# Patient Record
Sex: FEMALE | Race: WHITE | NOT HISPANIC OR LATINO | Employment: FULL TIME | URBAN - NONMETROPOLITAN AREA
[De-identification: names, ages, dates, MRNs, and addresses within clinical notes are randomized per-mention and may not be internally consistent; named-entity substitution may affect disease eponyms.]

---

## 2017-05-15 RX ORDER — NORETHINDRONE ACETATE AND ETHINYL ESTRADIOL 1MG-20(21)
KIT ORAL
Qty: 28 TABLET | Refills: 11 | OUTPATIENT
Start: 2017-05-15

## 2017-05-19 RX ORDER — NORETHINDRONE ACETATE AND ETHINYL ESTRADIOL 1MG-20(21)
KIT ORAL
Qty: 28 TABLET | Refills: 11 | OUTPATIENT
Start: 2017-05-19

## 2017-06-14 RX ORDER — NORETHINDRONE ACETATE AND ETHINYL ESTRADIOL 1MG-20(21)
KIT ORAL
Qty: 28 TABLET | Refills: 0 | OUTPATIENT
Start: 2017-06-14

## 2017-06-16 RX ORDER — NORETHINDRONE ACETATE AND ETHINYL ESTRADIOL 1MG-20(21)
KIT ORAL
Qty: 28 TABLET | Refills: 0 | OUTPATIENT
Start: 2017-06-16

## 2017-07-18 RX ORDER — NORETHINDRONE ACETATE AND ETHINYL ESTRADIOL 1MG-20(21)
KIT ORAL
Qty: 28 TABLET | Refills: 0 | OUTPATIENT
Start: 2017-07-18

## 2018-10-26 ENCOUNTER — OFFICE VISIT (OUTPATIENT)
Dept: INTERNAL MEDICINE | Facility: CLINIC | Age: 28
End: 2018-10-26

## 2018-10-26 VITALS
SYSTOLIC BLOOD PRESSURE: 102 MMHG | OXYGEN SATURATION: 98 % | HEART RATE: 97 BPM | WEIGHT: 191 LBS | RESPIRATION RATE: 16 BRPM | BODY MASS INDEX: 29.98 KG/M2 | DIASTOLIC BLOOD PRESSURE: 84 MMHG | HEIGHT: 67 IN | TEMPERATURE: 97.9 F

## 2018-10-26 DIAGNOSIS — E10.65 TYPE 1 DIABETES MELLITUS WITH HYPERGLYCEMIA (HCC): ICD-10-CM

## 2018-10-26 DIAGNOSIS — N39.0 URINARY TRACT INFECTION WITHOUT HEMATURIA, SITE UNSPECIFIED: Primary | ICD-10-CM

## 2018-10-26 DIAGNOSIS — M54.5 LOW BACK PAIN, UNSPECIFIED BACK PAIN LATERALITY, UNSPECIFIED CHRONICITY, WITH SCIATICA PRESENCE UNSPECIFIED: ICD-10-CM

## 2018-10-26 LAB
BILIRUB BLD-MCNC: NEGATIVE MG/DL
CLARITY, POC: ABNORMAL
COLOR UR: YELLOW
GLUCOSE UR STRIP-MCNC: ABNORMAL MG/DL
KETONES UR QL: NEGATIVE
LEUKOCYTE EST, POC: ABNORMAL
NITRITE UR-MCNC: NEGATIVE MG/ML
PH UR: 7 [PH] (ref 5–8)
PROT UR STRIP-MCNC: ABNORMAL MG/DL
RBC # UR STRIP: NEGATIVE /UL
SP GR UR: 1.01 (ref 1–1.03)
UROBILINOGEN UR QL: NORMAL

## 2018-10-26 PROCEDURE — 81003 URINALYSIS AUTO W/O SCOPE: CPT | Performed by: NURSE PRACTITIONER

## 2018-10-26 PROCEDURE — 99214 OFFICE O/P EST MOD 30 MIN: CPT | Performed by: NURSE PRACTITIONER

## 2018-10-26 RX ORDER — SULFAMETHOXAZOLE AND TRIMETHOPRIM 800; 160 MG/1; MG/1
1 TABLET ORAL 2 TIMES DAILY
Qty: 14 TABLET | Refills: 0 | Status: SHIPPED | OUTPATIENT
Start: 2018-10-26 | End: 2018-11-02

## 2018-10-26 RX ORDER — RANITIDINE 150 MG/1
150 TABLET ORAL DAILY
COMMUNITY
End: 2019-02-26 | Stop reason: SDUPTHER

## 2018-10-26 RX ORDER — FLUCONAZOLE 150 MG/1
150 TABLET ORAL ONCE
Qty: 1 TABLET | Refills: 1 | Status: SHIPPED | OUTPATIENT
Start: 2018-10-26 | End: 2018-10-26

## 2018-10-26 NOTE — PROGRESS NOTES
Chief Complaint / Reason:      Chief Complaint   Patient presents with   • Urinary Tract Infection   • Back Pain       Subjective     HPI  Patient presents today to establish care and with complaints of left flank pain and possible kidney infection.  She denies fever or chills but states that she has been having a lot of burning and frequent urination.  Past medical history includes acid reflux depression diabetes.  Patient does see endocrinology for her diabetes and she reported her last hemoglobin A1c is 9.3.  Patient is up-to-date on vision and dental.  Patient denies any history of STDs besides HSV.  Age of menarche was 13 and she became sexually active when she was 18.  She denies any chance of pregnancy.  Surgical history includes wisdom teeth extraction.  She denies any substance abuse with exception of 3 glasses of alcohol per week.  History taken from: patient    PMH/FH/Social History were reviewed and updated appropriately in the electronic medical record.     Review of Systems:   Review of Systems   Constitutional: Positive for fatigue. Negative for appetite change, chills and fever.   HENT: Positive for ear pain. Negative for nosebleeds, sneezing and trouble swallowing.    Eyes: Negative.    Respiratory: Negative.  Negative for choking, chest tightness, shortness of breath and wheezing.    Cardiovascular: Negative.  Negative for palpitations and leg swelling.   Gastrointestinal: Negative.  Negative for abdominal pain, blood in stool, constipation, diarrhea, nausea and vomiting.   Endocrine: Positive for heat intolerance.   Genitourinary: Positive for difficulty urinating, dysuria and pelvic pain. Negative for frequency.   Musculoskeletal: Negative.  Negative for gait problem, neck pain and neck stiffness.   Skin: Negative.    Allergic/Immunologic: Negative.    Neurological: Positive for headaches. Negative for weakness, light-headedness and numbness.   Hematological: Negative.     Psychiatric/Behavioral: Positive for sleep disturbance. Negative for dysphoric mood.        Emotional problems     All other systems were reviewed and are negative.  Exceptions are noted in the subjective or above.      Objective     Vital Signs  Vitals:    10/26/18 1447   BP: 102/84   Pulse: 97   Resp: 16   Temp: 97.9 °F (36.6 °C)   SpO2: 98%       Body mass index is 29.91 kg/m².    Physical Exam   Constitutional: She is oriented to person, place, and time. She appears well-developed and well-nourished. No distress.   HENT:   Head: Normocephalic.   Right Ear: External ear normal.   Left Ear: External ear normal.   Neck: No thyromegaly present.   Cardiovascular: Normal rate, regular rhythm, normal heart sounds and intact distal pulses.    Pulmonary/Chest: Effort normal and breath sounds normal. She has no wheezes. She exhibits no tenderness.   Abdominal: Soft. Bowel sounds are normal. There is tenderness in the right lower quadrant, suprapubic area and left lower quadrant. There is CVA tenderness.   Lymphadenopathy:     She has no cervical adenopathy.   Neurological: She is alert and oriented to person, place, and time.   Skin: Skin is warm and dry. No rash noted. No erythema. No pallor.   Psychiatric: She has a normal mood and affect. Her behavior is normal. Judgment and thought content normal.   Nursing note and vitals reviewed.       Results Review:    I reviewed the patient's new clinical results.   Office Visit on 10/26/2018   Component Date Value Ref Range Status   • Color 10/26/2018 Yellow  Yellow, Straw, Dark Yellow, Ashley Final   • Clarity, UA 10/26/2018 Cloudy* Clear Final   • Specific Gravity  10/26/2018 1.010  1.005 - 1.030 Final   • pH, Urine 10/26/2018 7.0  5.0 - 8.0 Final   • Leukocytes 10/26/2018 500 Chase/ul* Negative Final   • Nitrite, UA 10/26/2018 Negative  Negative Final   • Protein, POC 10/26/2018 Trace* Negative mg/dL Final   • Glucose, UA 10/26/2018 250 mg/dL* Negative, 1000 mg/dL (3+) mg/dL  Final   • Ketones, UA 10/26/2018 Negative  Negative Final   • Urobilinogen, UA 10/26/2018 Normal  Normal Final   • Bilirubin 10/26/2018 Negative  Negative Final   • Blood, UA 10/26/2018 Negative  Negative Final       Medication Review:     Current Outpatient Prescriptions:   •  acyclovir (ZOVIRAX) 400 MG tablet, Take 1 tablet by mouth 5 (five) times per day, Disp: 35 tablet, Rfl: 0  •  glucose blood test strip, Use to test blood glucose 5 times a day, Disp: 450 each, Rfl: 3  •  insulin lispro (humaLOG) 100 UNIT/ML injection, Use in insulin pump. MAX 90 units per day, Disp: 80 mL, Rfl: 1  •  raNITIdine (ZANTAC) 150 MG tablet, Take 150 mg by mouth Daily., Disp: , Rfl:   •  sertraline (ZOLOFT) 25 MG tablet, Take 1 tablet by mouth Daily. (Patient taking differently: Take 50 mg by mouth Daily.), Disp: 30 tablet, Rfl: 5  •  sulfamethoxazole-trimethoprim (BACTRIM DS) 800-160 MG per tablet, Take 1 tablet by mouth 2 (Two) Times a Day for 7 days., Disp: 14 tablet, Rfl: 0    Assessment/Plan   Fariba was seen today for urinary tract infection and back pain.    Diagnoses and all orders for this visit:    Urinary tract infection without hematuria, site unspecified  -     POCT urinalysis dipstick, automated  -     Urine Culture - Urine, Urine, Clean Catch  -     sulfamethoxazole-trimethoprim (BACTRIM DS) 800-160 MG per tablet; Take 1 tablet by mouth 2 (Two) Times a Day for 7 days.  Increase fluid intake and rest.  Void often to empty bladder.  Wipe from front to back.  Empty your bladder after intercourse.   Avoid potentially irritating feminine products.      Low back pain, unspecified back pain laterality, unspecified chronicity, with sciatica presence unspecified  -     POCT urinalysis dipstick, automated    Type 1 diabetes mellitus with hyperglycemia (CMS/[facility])  Recommend patient maintain glycemic control to prevent UTIs and worsening conditions  Other orders  -     fluconazole (DIFLUCAN) 150 MG tablet; Take 1 tablet by mouth 1  (One) Time for 1 dose.      Return if symptoms worsen or fail to improve.    Marilin Thakur, APRN  10/26/2018

## 2018-10-29 LAB
BACTERIA UR CULT: ABNORMAL
BACTERIA UR CULT: ABNORMAL

## 2018-10-30 PROBLEM — E11.9 DIABETES MELLITUS (HCC): Status: ACTIVE | Noted: 2018-10-30

## 2018-10-30 NOTE — PROGRESS NOTES
Please contact patient and let her know results and see how she is doing.  If she is still having a lot of symptoms then we can give her a shot of penicillin as it was beta hemolytic strep group B.

## 2019-01-10 ENCOUNTER — OFFICE VISIT (OUTPATIENT)
Dept: INTERNAL MEDICINE | Facility: CLINIC | Age: 29
End: 2019-01-10

## 2019-01-10 VITALS
OXYGEN SATURATION: 98 % | HEART RATE: 74 BPM | TEMPERATURE: 97.6 F | WEIGHT: 191 LBS | DIASTOLIC BLOOD PRESSURE: 76 MMHG | SYSTOLIC BLOOD PRESSURE: 119 MMHG | HEIGHT: 67 IN | RESPIRATION RATE: 15 BRPM | BODY MASS INDEX: 29.98 KG/M2

## 2019-01-10 DIAGNOSIS — F33.0 MILD EPISODE OF RECURRENT MAJOR DEPRESSIVE DISORDER (HCC): ICD-10-CM

## 2019-01-10 DIAGNOSIS — R30.0 DYSURIA: Primary | ICD-10-CM

## 2019-01-10 LAB
BILIRUB BLD-MCNC: ABNORMAL MG/DL
CLARITY, POC: ABNORMAL
COLOR UR: YELLOW
GLUCOSE UR STRIP-MCNC: NEGATIVE MG/DL
KETONES UR QL: NEGATIVE
LEUKOCYTE EST, POC: ABNORMAL
NITRITE UR-MCNC: NEGATIVE MG/ML
PH UR: 5 [PH] (ref 5–8)
PROT UR STRIP-MCNC: ABNORMAL MG/DL
RBC # UR STRIP: NEGATIVE /UL
SP GR UR: 1.02 (ref 1–1.03)
UROBILINOGEN UR QL: ABNORMAL

## 2019-01-10 PROCEDURE — 81003 URINALYSIS AUTO W/O SCOPE: CPT | Performed by: NURSE PRACTITIONER

## 2019-01-10 PROCEDURE — 99213 OFFICE O/P EST LOW 20 MIN: CPT | Performed by: NURSE PRACTITIONER

## 2019-01-10 NOTE — PROGRESS NOTES
Chief Complaint / Reason:      Chief Complaint   Patient presents with   • Urinary Tract Infection       Subjective     HPI  Patient presents today for follow-up regarding UTI and states that she has had foul odor to urine but denies any burning or frequency fever or chills.  She does state that it has a foul odor in the mornings.  Her last culture showed group B strep.  Patient states that she has been seeing endocrinology and her last hemoglobin A1c is around 9.  In regards to patient's depression she states she is doing well and taking 50 mg Zoloft daily.  She denies SI or HI.  History taken from: patient    PMH/FH/Social History were reviewed and updated appropriately in the electronic medical record.     Review of Systems:   Review of Systems   Constitutional: Negative.    Respiratory: Negative.    Cardiovascular: Negative.    Gastrointestinal: Negative.    Genitourinary:        Foul odor to urine   Neurological: Negative.      All other systems were reviewed and are negative.  Exceptions are noted in the subjective or above.      Objective     Vital Signs  Vitals:    01/10/19 0937   BP: 119/76   Pulse: 74   Resp: 15   Temp: 97.6 °F (36.4 °C)   SpO2: 98%       Body mass index is 29.91 kg/m².    Physical Exam   Constitutional: She is oriented to person, place, and time. She appears well-developed and well-nourished.   HENT:   Head: Normocephalic.   Right Ear: External ear normal.   Left Ear: External ear normal.   Cardiovascular: Normal rate, regular rhythm and normal heart sounds.   Pulmonary/Chest: Effort normal and breath sounds normal.   Abdominal: Soft. Bowel sounds are normal. There is no CVA tenderness.   Neurological: She is alert and oriented to person, place, and time.   Skin: Skin is warm and dry.   Nursing note and vitals reviewed.       Results Review:    I reviewed the patient's new clinical results.   Office Visit on 01/10/2019   Component Date Value Ref Range Status   • Color 01/10/2019 Yellow   Yellow, Straw, Dark Yellow, Ashley Final   • Clarity, UA 01/10/2019 Cloudy* Clear Final   • Specific Gravity  01/10/2019 1.020  1.005 - 1.030 Final   • pH, Urine 01/10/2019 5.0  5.0 - 8.0 Final   • Leukocytes 01/10/2019 75 Chase/ul* Negative Final   • Nitrite, UA 01/10/2019 Negative  Negative Final   • Protein, POC 01/10/2019 Trace* Negative mg/dL Final   • Glucose, UA 01/10/2019 Negative  Negative, 1000 mg/dL (3+) mg/dL Final   • Ketones, UA 01/10/2019 Negative  Negative Final   • Urobilinogen, UA 01/10/2019 1 E.U./dL * Normal Final   • Bilirubin 01/10/2019 1 mg/dL* Negative Final   • Blood, UA 01/10/2019 Negative  Negative Final         Medication Review:     Current Outpatient Medications:   •  acyclovir (ZOVIRAX) 400 MG tablet, Take 1 tablet by mouth 5 (five) times per day, Disp: 35 tablet, Rfl: 0  •  glucose blood test strip, Use to test blood glucose 5 times a day, Disp: 450 each, Rfl: 3  •  insulin lispro (humaLOG) 100 UNIT/ML injection, Use in insulin pump. MAX 90 units per day, Disp: 80 mL, Rfl: 1  •  raNITIdine (ZANTAC) 150 MG tablet, Take 150 mg by mouth Daily., Disp: , Rfl:   •  sertraline (ZOLOFT) 25 MG tablet, Take 1 tablet by mouth Daily. (Patient taking differently: Take 50 mg by mouth Daily.), Disp: 30 tablet, Rfl: 5    Assessment/Plan   Fariba was seen today for urinary tract infection.    Diagnoses and all orders for this visit:    Dysuria  -     POCT urinalysis dipstick, automated  -     Urine Culture - Urine, Urine, Clean Catch  recommend patient increase water intake and maintain glycemic control  Mild episode of recurrent major depressive disorder (CMS/HCC)  stable on Zoloft      Return if symptoms worsen or fail to improve.    Marilin Thakur, APRN  01/10/2019

## 2019-01-12 LAB
BACTERIA UR CULT: NORMAL
BACTERIA UR CULT: NORMAL

## 2019-01-13 NOTE — PROGRESS NOTES
Please contact  patient and let her know results did not show bacteria but did show mixed urogenital anusha.

## 2019-02-26 RX ORDER — RANITIDINE 150 MG/1
150 TABLET ORAL DAILY
Qty: 90 TABLET | Refills: 3 | Status: SHIPPED | OUTPATIENT
Start: 2019-02-26 | End: 2019-03-05 | Stop reason: SDUPTHER

## 2019-02-26 NOTE — TELEPHONE ENCOUNTER
Patient left a voice mail requesting a 90 supply sent to [facility].    Patient informed medications sent

## 2019-03-05 RX ORDER — RANITIDINE 150 MG/1
150 TABLET ORAL DAILY
Qty: 90 TABLET | Refills: 3 | Status: SHIPPED | OUTPATIENT
Start: 2019-03-05 | End: 2020-05-08 | Stop reason: SDUPTHER

## 2020-01-14 NOTE — TELEPHONE ENCOUNTER
PT CALLED REQUESTING ACYCLOVIR 400 MG SENT TO [facility] IN [place] CONFIRMED

## 2020-01-15 RX ORDER — ACYCLOVIR 400 MG/1
TABLET ORAL
Qty: 35 TABLET | Refills: 0 | Status: SHIPPED | OUTPATIENT
Start: 2020-01-15 | End: 2020-08-14

## 2020-05-07 NOTE — PROGRESS NOTES
You have chosen to receive care through a telehealth visit.  Do you consent to use a video/audio connection for your medical care today? Yes  No covid symptoms  Involved parties: Hodan Garcia CMA, MELVIN Aquino and patient.  Patient presents for a virtual visit. This visit was scheduled as a video visit to comply with patient safety concerns in accordance with CDC recommendations.    Chief Complaint / Reason:      Chief Complaint   Patient presents with   • Depression     refill on zoloft.        Subjective     HPI  Patient requests refill on Zoloft and states overall she is doing good denies SI or HI.  She states she has been working from home and is doing well.  Patient also requests refill on ranitidine and she and I discussed medication options available for change.  Patient is living in [place] and is not up-to-date on Pap smear.  She states that she did have labs done through endocrinology but labs are not available for review at this time.  History taken from: patient    PMH/FH/Social History were reviewed and updated appropriately in the electronic medical record.   Past Medical History:   Diagnosis Date   • Diabetes mellitus (CMS/[facility])    • GERD (gastroesophageal reflux disease)    • HSV (herpes simplex virus) anogenital infection      Past Surgical History:   Procedure Laterality Date   • WISDOM TOOTH EXTRACTION       Social History     Socioeconomic History   • Marital status: Single     Spouse name: Not on file   • Number of children: Not on file   • Years of education: Not on file   • Highest education level: Not on file     No family history on file.    Review of Systems:   Review of Systems   Constitutional: Negative.    Respiratory: Negative.    Cardiovascular: Negative.    Neurological: Negative.    Psychiatric/Behavioral: Negative.          All other systems were reviewed and are negative.  Exceptions are noted in the subjective or above.      Objective     Vital Signs  There were no vitals  filed for this visit.    There is no height or weight on file to calculate BMI.    Physical Exam   Constitutional: She is oriented to person, place, and time. She appears well-developed and well-nourished. No distress.   Physical examination directed per patient.   Pulmonary/Chest: Effort normal. No respiratory distress. She exhibits no tenderness.   Musculoskeletal: Normal range of motion.   Neurological: She is alert and oriented to person, place, and time. Coordination normal.   Skin: Skin is warm and dry. Capillary refill takes less than 2 seconds.   Psychiatric: She has a normal mood and affect. Her behavior is normal. Judgment and thought content normal.   Nursing note reviewed.           Results Review:    I reviewed the patient's previous clinical results.       Medication Review:     Current Outpatient Medications:   •  acyclovir (ZOVIRAX) 400 MG tablet, Take 1 tablet by mouth 5 (five) times per day, Disp: 35 tablet, Rfl: 0  •  glucose blood test strip, Use to test blood glucose 5 times a day, Disp: 450 each, Rfl: 3  •  insulin lispro (humaLOG) 100 UNIT/ML injection, Use in insulin pump. MAX 90 units per day, Disp: 80 mL, Rfl: 1  •  sertraline (ZOLOFT) 50 MG tablet, Take 1 tablet by mouth Daily., Disp: 90 tablet, Rfl: 3  •  Accu-Chek Softclix Lancets lancets, Accu-Chek Softclix Lancets, Disp: , Rfl:   •  famotidine (PEPCID) 10 MG tablet, Take 2 tablets by mouth At Night As Needed for Indigestion or Heartburn., Disp: 90 tablet, Rfl: 1  •  glucose blood test strip, Accu-Chek Guide Glucose Meter, Disp: , Rfl:   •  NOVOLOG 100 UNIT/ML injection, INJECT 120 UNITS DAILY IN PUMP, Disp: , Rfl:   •  promethazine (PHENERGAN) 25 MG tablet, promethazine 25 mg tablet, Disp: , Rfl:     Assessment/Plan   Fariba was seen today for depression.    Diagnoses and all orders for this visit:    Mild episode of recurrent major depressive disorder (CMS/HCC)  -     sertraline (ZOLOFT) 50 MG tablet; Take 1 tablet by mouth  Daily.  Stable on current medication regimen  Pap smear for cervical cancer screening  -     Ambulatory Referral to Gynecology    Gastroesophageal reflux disease, esophagitis presence not specified  -     famotidine (PEPCID) 10 MG tablet; Take 2 tablets by mouth At Night As Needed for Indigestion or Heartburn.  Discontinued Zantac    Need to obtain labs from endocrinology  Return if symptoms worsen or fail to improve, for Annual.    Marilin Thakur, APRN  05/08/2020

## 2020-05-08 ENCOUNTER — TELEMEDICINE (OUTPATIENT)
Dept: INTERNAL MEDICINE | Facility: CLINIC | Age: 30
End: 2020-05-08

## 2020-05-08 DIAGNOSIS — F33.0 MILD EPISODE OF RECURRENT MAJOR DEPRESSIVE DISORDER (HCC): Primary | ICD-10-CM

## 2020-05-08 DIAGNOSIS — K21.9 GASTROESOPHAGEAL REFLUX DISEASE, ESOPHAGITIS PRESENCE NOT SPECIFIED: ICD-10-CM

## 2020-05-08 DIAGNOSIS — Z12.4 PAP SMEAR FOR CERVICAL CANCER SCREENING: ICD-10-CM

## 2020-05-08 PROCEDURE — 99213 OFFICE O/P EST LOW 20 MIN: CPT | Performed by: NURSE PRACTITIONER

## 2020-05-08 RX ORDER — PANTOPRAZOLE SODIUM 20 MG/1
TABLET, DELAYED RELEASE ORAL
COMMUNITY
End: 2020-05-08

## 2020-05-08 RX ORDER — PROMETHAZINE HYDROCHLORIDE 25 MG/1
TABLET ORAL
COMMUNITY

## 2020-05-08 RX ORDER — LANCETS
EACH MISCELLANEOUS
COMMUNITY

## 2020-05-08 RX ORDER — FAMOTIDINE 10 MG
20 TABLET ORAL NIGHTLY PRN
Qty: 90 TABLET | Refills: 1 | Status: SHIPPED | OUTPATIENT
Start: 2020-05-08

## 2020-05-29 ENCOUNTER — RESULTS ENCOUNTER (OUTPATIENT)
Dept: INTERNAL MEDICINE | Facility: CLINIC | Age: 30
End: 2020-05-29

## 2020-05-29 DIAGNOSIS — R11.14 BILIOUS VOMITING WITH NAUSEA: ICD-10-CM

## 2020-05-29 DIAGNOSIS — E10.65 TYPE 1 DIABETES MELLITUS WITH HYPERGLYCEMIA (HCC): ICD-10-CM

## 2020-05-29 DIAGNOSIS — E10.65 TYPE 1 DIABETES MELLITUS WITH HYPERGLYCEMIA (HCC): Primary | ICD-10-CM

## 2020-08-14 RX ORDER — ACYCLOVIR 400 MG/1
TABLET ORAL
Qty: 35 TABLET | Refills: 0 | Status: SHIPPED | OUTPATIENT
Start: 2020-08-14 | End: 2021-05-07

## 2020-12-21 ENCOUNTER — OFFICE VISIT (OUTPATIENT)
Dept: ENDOCRINOLOGY | Facility: CLINIC | Age: 30
End: 2020-12-21

## 2020-12-21 VITALS
BODY MASS INDEX: 30.31 KG/M2 | RESPIRATION RATE: 16 BRPM | HEART RATE: 92 BPM | OXYGEN SATURATION: 100 % | WEIGHT: 200 LBS | DIASTOLIC BLOOD PRESSURE: 74 MMHG | HEIGHT: 68 IN | TEMPERATURE: 97.1 F | SYSTOLIC BLOOD PRESSURE: 128 MMHG

## 2020-12-21 DIAGNOSIS — E10.65 TYPE 1 DIABETES MELLITUS WITH HYPERGLYCEMIA (HCC): Primary | ICD-10-CM

## 2020-12-21 LAB
EXPIRATION DATE: NORMAL
HBA1C MFR BLD: 9.5 %
Lab: NORMAL

## 2020-12-21 PROCEDURE — 83036 HEMOGLOBIN GLYCOSYLATED A1C: CPT | Performed by: PHYSICIAN ASSISTANT

## 2020-12-21 PROCEDURE — 99213 OFFICE O/P EST LOW 20 MIN: CPT | Performed by: PHYSICIAN ASSISTANT

## 2020-12-21 RX ORDER — PROCHLORPERAZINE 25 MG/1
1 SUPPOSITORY RECTAL SEE ADMIN INSTRUCTIONS
Qty: 1 DEVICE | Refills: 0 | Status: SHIPPED | OUTPATIENT
Start: 2020-12-21

## 2020-12-21 RX ORDER — BLOOD SUGAR DIAGNOSTIC
STRIP MISCELLANEOUS
Qty: 500 EACH | Refills: 3 | Status: SHIPPED | OUTPATIENT
Start: 2020-12-21 | End: 2022-02-11 | Stop reason: SDUPTHER

## 2020-12-21 RX ORDER — PROCHLORPERAZINE 25 MG/1
SUPPOSITORY RECTAL
Qty: 9 EACH | Refills: 3 | Status: SHIPPED | OUTPATIENT
Start: 2020-12-21

## 2020-12-21 RX ORDER — PROCHLORPERAZINE 25 MG/1
1 SUPPOSITORY RECTAL
Qty: 1 EACH | Refills: 3 | Status: SHIPPED | OUTPATIENT
Start: 2020-12-21

## 2020-12-21 NOTE — PROGRESS NOTES
"     Office Note      Date: 2020  Patient Name: Fariba Bronson  MRN: [id number]  : 1990    Chief Complaint   Patient presents with   • Diabetes       History of Present Illness:   Fariba Bronson is a 30 y.o. female who presents for 1 diabetes.  Patient reports she started working for [employer] and was going there for her diabetes control.  She reports she feels like her blood sugars are out of control and her high most of the time.  She remains on her Omni pod insulin pump with NovoLog insulin.  She is checking her blood sugar 4 times daily and is interested in considering a Dexcom continuous glucose monitor.  She denies any severe or frequent hypoglycemia but does note she feels low when her blood sugars are in the 90s.  She reports she has not been eating like she should has not been as active since she has been working from home during the pandemic.  Reports she goes for her annual eye exams in January.  She has not had her flu vaccine yet this  and prefers not to get it today.  She denies any trouble with her feet.  She reports she had full fasting labs checked in the spring at [employer] we will try to get copies of these results and send them to us.      Subjective     Review of Systems:   Review of Systems   Constitutional: Negative.    Cardiovascular: Negative.    Gastrointestinal: Negative.    Endocrine: Negative.    Neurological: Negative.        The following portions of the patient's history were reviewed and updated as appropriate: allergies, current medications, past family history, past medical history, past social history, past surgical history and problem list.    Objective     Vitals:    20 1538   BP: 128/74   Pulse: 92   Resp: 16   Temp: 97.1 °F (36.2 °C)   TempSrc: Temporal   SpO2: 100%   Weight: 90.7 kg (200 lb)   Height: 172.7 cm (68\")     Body mass index is 30.41 kg/m².    Physical Exam  Vitals signs reviewed.   Constitutional:       General: She is not in acute distress.     " Appearance: Normal appearance.   Cardiovascular:      Pulses:           Dorsalis pedis pulses are 2+ on the right side and 2+ on the left side.        Posterior tibial pulses are 2+ on the right side and 2+ on the left side.   Musculoskeletal:      Right foot: No deformity.      Left foot: No deformity.   Feet:      Right foot:      Protective Sensation: 5 sites tested. 5 sites sensed.      Skin integrity: Dry skin present.      Toenail Condition: Right toenails are normal.      Left foot:      Protective Sensation: 5 sites tested. 5 sites sensed.      Skin integrity: Dry skin present.      Toenail Condition: Left toenails are normal.      Comments: Diabetic Foot Exam Performed and Monofilament Test Performed    Neurological:      Mental Status: She is alert.         HEMOGLOBIN A1C  Lab Results   Component Value Date    HGBA1C 9.5 12/21/2020           Assessment / Plan      Assessment & Plan:  1. Type 1 diabetes mellitus with hyperglycemia (CMS/[facility])  Her A1c is 9.5% today.  This is up as compared to her last A1c in April 2019 when she last saw us and is well above goal.  Reviewed OmniPod download increased her basal rates by 0.1 from 1030 to 2:30 PM and 2:30 PM to 12 AM and changed her carb ratio during the day from 10 to 8 to try to improve hyperglycemia.  Encourage patient to send in blood sugars for review and adjustment as needed.  Sent in prescription for Dexcom supplies to her local pharmacy to see if this would be covered.  If this is not covered advised patient we could send in paperwork through Dexcom.  Blood pressure okay today  Her weight is up 11 pounds since she was seen April 2019.  Courage healthy eating habits and physical activity  Foot exam okay today.  Patient to call as needed  - POC Glycosylated Hemoglobin (Hb A1C)      Return in about 3 months (around 3/21/2021) for Recheck.     Deidre Joshi PA-C  12/21/2020

## 2020-12-30 ENCOUNTER — TELEPHONE (OUTPATIENT)
Dept: ENDOCRINOLOGY | Facility: CLINIC | Age: 30
End: 2020-12-30

## 2020-12-30 NOTE — TELEPHONE ENCOUNTER
Called pt and told we received the PA request and will get it processed. She did not have any clinical questions. She verbalized understanding.

## 2020-12-30 NOTE — TELEPHONE ENCOUNTER
Kasie called wanting to speak to someone about prior authorization for a medication and has some clinical  questions. Please advise

## 2020-12-31 ENCOUNTER — TELEPHONE (OUTPATIENT)
Dept: ENDOCRINOLOGY | Facility: CLINIC | Age: 30
End: 2020-12-31

## 2020-12-31 NOTE — TELEPHONE ENCOUNTER
Approvedon December 30  PA Case: [id number], Status: Approved, Coverage Starts on: 12/30/2020 12:00:00 AM, Coverage Ends on: 12/31/2021 12:00:00 AM. Questions? Contact [phone].  Drug  Dexcom G6  device

## 2021-02-02 ENCOUNTER — TELEPHONE (OUTPATIENT)
Dept: ENDOCRINOLOGY | Facility: CLINIC | Age: 31
End: 2021-02-02

## 2021-02-02 NOTE — TELEPHONE ENCOUNTER
Called and spoke with pt-- she is on her omnipod pump and is not using Basaglar.  She reports her BGs have been elevated between 5-10p-- she will increase her bolus by 2-4 units to see if this helps and explained how to set increased temp basal rate if that does not help.  She will call prn.  RT  
Pt called her blood sugars running in the evening 400 to 500 this happen every month around her period time. Pt want to know if this is a basaglar correct or carb ratio corrections. Pt last seen 12/21/20 pt next appt 03/23//21. Please notify pt  
Spoke to pt.  She is checking 3-4 times per day and its reading 200's - 500's every month around her period.   She wants to know if she should correct basaglar or carb ratio correct?  Please advise  
DISCHARGE

## 2021-03-23 ENCOUNTER — OFFICE VISIT (OUTPATIENT)
Dept: ENDOCRINOLOGY | Facility: CLINIC | Age: 31
End: 2021-03-23

## 2021-03-23 VITALS
TEMPERATURE: 98.2 F | HEIGHT: 67 IN | BODY MASS INDEX: 32.99 KG/M2 | WEIGHT: 210.2 LBS | SYSTOLIC BLOOD PRESSURE: 116 MMHG | DIASTOLIC BLOOD PRESSURE: 78 MMHG

## 2021-03-23 DIAGNOSIS — E10.65 TYPE 1 DIABETES MELLITUS WITH HYPERGLYCEMIA (HCC): Primary | ICD-10-CM

## 2021-03-23 LAB
EXPIRATION DATE: NORMAL
HBA1C MFR BLD: 8.5 %
Lab: NORMAL

## 2021-03-23 PROCEDURE — 99213 OFFICE O/P EST LOW 20 MIN: CPT | Performed by: PHYSICIAN ASSISTANT

## 2021-03-23 PROCEDURE — 83036 HEMOGLOBIN GLYCOSYLATED A1C: CPT | Performed by: PHYSICIAN ASSISTANT

## 2021-03-23 NOTE — PROGRESS NOTES
"     Office Note      Date: 2021  Patient Name: Fariba Bronson  MRN: [id number]  : 1990    Chief Complaint   Patient presents with   • Follow-up   • Diabetes       History of Present Illness:   Fariba Bronson is a 30 y.o. female who presents for type 1 diabetes.  She remains on her Omni pod insulin pump with NovoLog insulin.  She reports she is going to wait on the Dexcom until  plans to meet her deductible adding this device.  She reports it was going to be expensive when she tried to get it earlier but it was approved by insurance.  She denies any trouble with hypoglycemia.  Reports her blood sugars have improved but still continue to stay in the 200s most of the time she checks.  She is not having as many 300 and 400 mg/dL readings.  She reports her periods continue to be a problem she would like to add a second basal rate to help improve this.  She reports she is concerned about her weight she reports she is trying to eat healthier and still gained 10 pounds since her appointment in December.  She reports she is due for eye exam and will get this scheduled.  She does not plan to get the Covid vaccine.  We did her foot exam at her appointment in December.  She had labs late spring we will plan to the these next appointment.      Subjective     Review of Systems:   Review of Systems   Constitutional: Positive for unexpected weight change.   Cardiovascular: Negative.    Gastrointestinal: Negative.    Endocrine: Negative.    Neurological: Negative.        The following portions of the patient's history were reviewed and updated as appropriate: allergies, current medications, past family history, past medical history, past social history, past surgical history and problem list.    Objective     Vitals:    21 1443   BP: 116/78   Temp: 98.2 °F (36.8 °C)   TempSrc: Infrared   Weight: 95.3 kg (210 lb 3.2 oz)   Height: 170.2 cm (67\")     Body mass index is 32.92 kg/m².    Physical " Exam  Vitals reviewed.   Constitutional:       General: She is not in acute distress.     Appearance: Normal appearance.   Neurological:      Mental Status: She is alert.         HEMOGLOBIN A1C  Lab Results   Component Value Date    HGBA1C 8.5 03/23/2021       Assessment / Plan      Assessment & Plan:  1. Type 1 diabetes mellitus with hyperglycemia (CMS/[facility])  A1c has improved but is still above goal at 8.5%.  I reviewed her OmniPod download and we increased her 10:30 AM to 2:30 PM basal rate by 0.1 to try to prevent hyperglycemia.  We also changed her insulin sensitivity factor from 40-30 to help improve hyperglycemia.  We added a second basal rate profile with 0.2 higher basal rates than her regular basal rates to use during her period.  Discussed sending her for nutrition counseling.  She reports she has had this before and does not feel this would help.  We discussed considering adding Symlin her diabetes regimen to help promote weight loss.  We also discussed considering ending her to the medical weight loss clinic through [facility].  She prefers to hold off on these options for now but will let me know.  Encourage patient to call with blood sugars as needed for review and adjustment.  Patient to call as needed  - POC Glycosylated Hemoglobin (Hb A1C)      Return in about 3 months (around 6/23/2021) for Recheck.     Deidre Joshi PA-C  03/23/2021

## 2021-05-07 RX ORDER — VALACYCLOVIR HYDROCHLORIDE 1 G/1
1000 TABLET, FILM COATED ORAL 2 TIMES DAILY
Qty: 10 TABLET | Refills: 1 | Status: SHIPPED | OUTPATIENT
Start: 2021-05-07 | End: 2021-10-17 | Stop reason: SDUPTHER

## 2021-05-10 RX ORDER — VALACYCLOVIR HYDROCHLORIDE 1 G/1
1000 TABLET, FILM COATED ORAL 2 TIMES DAILY
Qty: 10 TABLET | Refills: 1 | Status: CANCELLED | OUTPATIENT
Start: 2021-05-10

## 2021-09-01 ENCOUNTER — OFFICE VISIT (OUTPATIENT)
Dept: ENDOCRINOLOGY | Facility: CLINIC | Age: 31
End: 2021-09-01

## 2021-09-01 VITALS
SYSTOLIC BLOOD PRESSURE: 118 MMHG | HEART RATE: 84 BPM | WEIGHT: 205 LBS | OXYGEN SATURATION: 98 % | HEIGHT: 67 IN | DIASTOLIC BLOOD PRESSURE: 76 MMHG | BODY MASS INDEX: 32.18 KG/M2

## 2021-09-01 DIAGNOSIS — E10.65 TYPE 1 DIABETES MELLITUS WITH HYPERGLYCEMIA (HCC): Primary | ICD-10-CM

## 2021-09-01 LAB
EXPIRATION DATE: ABNORMAL
EXPIRATION DATE: NORMAL
GLUCOSE BLDC GLUCOMTR-MCNC: 164 MG/DL (ref 70–130)
HBA1C MFR BLD: 9.1 %
Lab: ABNORMAL
Lab: NORMAL

## 2021-09-01 PROCEDURE — 99213 OFFICE O/P EST LOW 20 MIN: CPT | Performed by: PHYSICIAN ASSISTANT

## 2021-09-01 PROCEDURE — 83036 HEMOGLOBIN GLYCOSYLATED A1C: CPT | Performed by: PHYSICIAN ASSISTANT

## 2021-09-01 PROCEDURE — 82947 ASSAY GLUCOSE BLOOD QUANT: CPT | Performed by: PHYSICIAN ASSISTANT

## 2021-09-01 RX ORDER — FLUTICASONE PROPIONATE 50 MCG
2 SPRAY, SUSPENSION (ML) NASAL DAILY
Qty: 15.8 ML | Refills: 3 | Status: SHIPPED | OUTPATIENT
Start: 2021-09-01

## 2021-09-01 NOTE — PROGRESS NOTES
"     Office Note      Date: 2021  Patient Name: Fariba Bronson  MRN: [id number]  : 1990    Chief Complaint   Patient presents with   • Diabetes       History of Present Illness:   Fariba Bronson is a 30 y.o. female who presents for follow up for Type 1 diabetes.  She remains on the OmniPod insulin pump with NovoLog insulin.  She reports she was checking her blood sugars pretty regularly but recently has slacked off some.  She reports she has remained on her basal profile to which was the increase basal rate profile we added for when she was on her period.  She reports she has not had any trouble with hypoglycemia on this profile.  She reports she started running recently and is trying to eat smaller portion sizes.  She reports she does bolus regularly when she eats.  She was happy to see she had lost some weight today.  She reports she may be moving to [place] in the near future.  Her boyfriend took a job in [place] has been driving back and forth so they plan to move in the next few months.  She is asking about the Covid vaccine today.  Her job is requiring this.  She denies any trouble with her feet today we did her foot exam at her appointment in December.  We had plan to do labs today but patient is asking if we can hold off for now.      Subjective     Review of Systems:   Review of Systems   Constitutional: Negative.    Cardiovascular: Negative.    Gastrointestinal: Negative.    Endocrine: Negative.    Neurological: Negative.        The following portions of the patient's history were reviewed and updated as appropriate: allergies, current medications, past family history, past medical history, past social history, past surgical history and problem list.    Objective     Vitals:    21 1100   BP: 118/76   BP Location: Right arm   Patient Position: Sitting   Cuff Size: Adult   Pulse: 84   SpO2: 98%   Weight: 93 kg (205 lb)   Height: 170.2 cm (67\")   PainSc: 0-No pain     Body " mass index is 32.11 kg/m².    Physical Exam  Vitals reviewed.   Constitutional:       General: She is not in acute distress.     Appearance: Normal appearance.   Neurological:      Mental Status: She is alert.         HEMOGLOBIN A1C  Lab Results   Component Value Date    HGBA1C 9.1 09/01/2021       GLUCOSE  Glucose   Date Value Ref Range Status   09/01/2021 164 (A) 70 - 130 mg/dL Final           Assessment / Plan      Assessment & Plan:  1. Type 1 diabetes mellitus with hyperglycemia (CMS/[facility])  Her A1c today is higher than it was in March and well above goal at 9.1%.  Her blood glucose this morning was okay at 164 mg/dL.  She reports higher blood sugars in the afternoons and evenings.  I reviewed her OmniPod download today but there is not any blood glucose readings for review.  We increased her basal rate at 2:30 PM by 0.2 to try to prevent hyperglycemia in the afternoons and evenings.  We discussed her blood sugars may improve if she loses weight and continues to run.  She will contact me if hypoglycemia becomes a problem.  I encouraged her to send in blood sugars for review and adjustment as needed.  She does have a prescription for the Dexcom supplies on file if she decides to try this.  Her blood pressure is okay today.  Her weight is down 5 pounds since her appointment in March.  I encouraged continued healthy eating habits and physical activity as tolerated.  I encouraged her to get the Covid vaccine and answered some questions.  She did not want to have labs done today.  We will plan to do these at her follow-up appointment.  Patient to call as needed.  I refilled her insulin today.  - POC Glycosylated Hemoglobin (Hb A1C)  - POC Glucose, Blood      Return in about 3 months (around 12/1/2021) for Recheck 3-4 mos.     This note was dictated using Dragon voice recognition.    Deidre Joshi PA-C  09/01/2021

## 2021-10-17 RX ORDER — VALACYCLOVIR HYDROCHLORIDE 1 G/1
1000 TABLET, FILM COATED ORAL 2 TIMES DAILY
Qty: 10 TABLET | Refills: 1 | Status: SHIPPED | OUTPATIENT
Start: 2021-10-17 | End: 2022-03-28 | Stop reason: SDUPTHER

## 2021-12-28 ENCOUNTER — PATIENT MESSAGE (OUTPATIENT)
Dept: INTERNAL MEDICINE | Facility: CLINIC | Age: 31
End: 2021-12-28

## 2021-12-30 RX ORDER — METRONIDAZOLE 500 MG/1
500 TABLET ORAL 2 TIMES DAILY
Qty: 14 TABLET | Refills: 0 | Status: SHIPPED | OUTPATIENT
Start: 2021-12-30 | End: 2022-01-06

## 2022-02-06 RX ORDER — POLYMYXIN B SULFATE AND TRIMETHOPRIM 1; 10000 MG/ML; [USP'U]/ML
1 SOLUTION OPHTHALMIC
Qty: 10 ML | Refills: 0 | Status: SHIPPED | OUTPATIENT
Start: 2022-02-06 | End: 2022-02-11

## 2022-02-11 RX ORDER — BLOOD SUGAR DIAGNOSTIC
STRIP MISCELLANEOUS
Qty: 500 EACH | Refills: 3 | Status: SHIPPED | OUTPATIENT
Start: 2022-02-11

## 2022-02-11 NOTE — TELEPHONE ENCOUNTER
PT CALLED STATING SHE MOVED TO [place] SHE WORKING WITH HER NEW PRIMARY DOCTOR ON GETTING A REFERRAL TO ENDOCRINOLOGY THERE. SHE NEED A 30 DAY REFILL FOR FREE STYLE [facility], [facility] [address]

## 2022-03-28 RX ORDER — VALACYCLOVIR HYDROCHLORIDE 1 G/1
1000 TABLET, FILM COATED ORAL 2 TIMES DAILY
Qty: 10 TABLET | Refills: 1 | Status: SHIPPED | OUTPATIENT
Start: 2022-03-28

## 2023-10-31 ENCOUNTER — TELEPHONE (OUTPATIENT)
Dept: ENDOCRINOLOGY | Facility: CLINIC | Age: 33
End: 2023-10-31

## 2023-10-31 NOTE — TELEPHONE ENCOUNTER
Caller: Fariba Bronson    Relationship: Self    Best call back number: [phone]    Requested Prescriptions: OMNIPOD 5 - DEXCOM G6 SENSOR - NOVOLOG        Pharmacy where request should be sent:  [facility]/PHARMACY #[id number] [address] [phone] [place] [phone] FX [[id number]]     Last office visit with prescribing clinician: Visit date not found   Last telemedicine visit with prescribing clinician: Visit date not found   Next office visit with prescribing clinician: 2/6/2024     Additional details provided by patient:     Does the patient have less than a 3 day supply:  [x] Yes  [] No    Would you like a call back once the refill request has been completed: [x] Yes [] No    If the office needs to give you a call back, can they leave a voicemail: [x] Yes [] No    Sariah Jeter Rep   10/31/23 14:54 EDT

## 2023-10-31 NOTE — TELEPHONE ENCOUNTER
Spoke with patient.  Has not been seen since 09/2021.  Advised would have to be seen prior to refilling medicines as it has been over 2 years since seen in the office.

## 2023-11-15 ENCOUNTER — OFFICE VISIT (OUTPATIENT)
Dept: INTERNAL MEDICINE | Facility: CLINIC | Age: 33
End: 2023-11-15
Payer: MEDICAID

## 2023-11-15 ENCOUNTER — PRIOR AUTHORIZATION (OUTPATIENT)
Dept: INTERNAL MEDICINE | Facility: CLINIC | Age: 33
End: 2023-11-15

## 2023-11-15 VITALS
HEART RATE: 89 BPM | DIASTOLIC BLOOD PRESSURE: 73 MMHG | WEIGHT: 213 LBS | OXYGEN SATURATION: 97 % | BODY MASS INDEX: 32.28 KG/M2 | SYSTOLIC BLOOD PRESSURE: 120 MMHG | TEMPERATURE: 97.6 F | RESPIRATION RATE: 18 BRPM | HEIGHT: 68 IN

## 2023-11-15 DIAGNOSIS — E10.65 TYPE 1 DIABETES MELLITUS WITH HYPERGLYCEMIA: Primary | ICD-10-CM

## 2023-11-15 DIAGNOSIS — F33.1 MODERATE EPISODE OF RECURRENT MAJOR DEPRESSIVE DISORDER: ICD-10-CM

## 2023-11-15 DIAGNOSIS — F41.9 ANXIETY: ICD-10-CM

## 2023-11-15 PROBLEM — N12 PYELONEPHRITIS: Status: ACTIVE | Noted: 2022-10-09

## 2023-11-15 LAB
EXPIRATION DATE: ABNORMAL
HBA1C MFR BLD: 9.4 % (ref 4.5–5.7)
Lab: ABNORMAL
POC CREATININE URINE: 200
POC MICROALBUMIN URINE: 10

## 2023-11-15 RX ORDER — PROCHLORPERAZINE 25 MG/1
SUPPOSITORY RECTAL
Qty: 9 EACH | Refills: 3 | Status: SHIPPED | OUTPATIENT
Start: 2023-11-15 | End: 2023-11-15 | Stop reason: SDUPTHER

## 2023-11-15 RX ORDER — INSULIN ASPART 100 [IU]/ML
INJECTION, SOLUTION INTRAVENOUS; SUBCUTANEOUS
COMMUNITY
Start: 2023-10-24 | End: 2024-10-24

## 2023-11-15 RX ORDER — INSULIN PMP CART,AUT,G6/7,CNTR
EACH SUBCUTANEOUS
COMMUNITY
Start: 2023-10-18 | End: 2023-11-15 | Stop reason: SDUPTHER

## 2023-11-15 RX ORDER — BLOOD SUGAR DIAGNOSTIC
STRIP MISCELLANEOUS
Qty: 500 EACH | Refills: 3 | Status: SHIPPED | OUTPATIENT
Start: 2023-11-15 | End: 2023-11-16 | Stop reason: SDUPTHER

## 2023-11-15 RX ORDER — PROCHLORPERAZINE 25 MG/1
1 SUPPOSITORY RECTAL SEE ADMIN INSTRUCTIONS
Qty: 1 EACH | Refills: 0 | Status: SHIPPED | OUTPATIENT
Start: 2023-11-15

## 2023-11-15 RX ORDER — INSULIN PMP CART,AUT,G6/7,CNTR
1 EACH SUBCUTANEOUS DAILY
Qty: 5 EACH | Refills: 6 | Status: SHIPPED | OUTPATIENT
Start: 2023-11-15

## 2023-11-15 RX ORDER — GLUCAGON INJECTION, SOLUTION 1 MG/.2ML
0.2 INJECTION, SOLUTION SUBCUTANEOUS ONCE AS NEEDED
Qty: 0.2 ML | Refills: 3 | Status: SHIPPED | OUTPATIENT
Start: 2023-11-15

## 2023-11-15 RX ORDER — ACYCLOVIR 400 MG/1
1 TABLET ORAL
Qty: 9 EACH | Refills: 3 | Status: SHIPPED | OUTPATIENT
Start: 2023-11-15

## 2023-11-15 RX ORDER — GLUCAGON INJECTION, SOLUTION 1 MG/.2ML
0.2 INJECTION, SOLUTION SUBCUTANEOUS
COMMUNITY
Start: 2022-12-22 | End: 2023-11-15 | Stop reason: SDUPTHER

## 2023-11-15 RX ORDER — FAMOTIDINE 10 MG
10 TABLET ORAL DAILY
COMMUNITY
End: 2023-11-15 | Stop reason: SDUPTHER

## 2023-11-15 RX ORDER — ACYCLOVIR 400 MG/1
1 TABLET ORAL
Qty: 1 EACH | Refills: 0 | COMMUNITY
Start: 2023-11-15

## 2023-11-15 RX ORDER — PROCHLORPERAZINE 25 MG/1
1 SUPPOSITORY RECTAL
Qty: 1 EACH | Refills: 3 | Status: SHIPPED | OUTPATIENT
Start: 2023-11-15

## 2023-11-15 NOTE — PROGRESS NOTES
Chief Complaint / Reason:      Chief Complaint   Patient presents with    Med Refill     Re-est care       Subjective     HPI    History taken from: patient    The patient is a 32-year-old female who is here to reestablish care. She needs medication refills and she is type 1 diabetic with hyperglycemia.    The patient inquires about increasing the Zoloft from 25 mg to 75 mg. Her last A1c was approximately 6 percent when she was pregnant. She has an appointment on 02/06/2024. She had a Pap smear at her first appointment.  Her last Pap smear was in 05/2022. She did not have a colposcopy. She believes she gained weight due to being sick. She denies any urinary symptoms.      PMH/FH/Social History were reviewed and updated appropriately in the electronic medical record.   Past Medical History:   Diagnosis Date    Diabetes mellitus     GERD (gastroesophageal reflux disease)     HSV (herpes simplex virus) anogenital infection     Insulin pump in place     Obesity     body mass index 30+-obesity    Type 1 diabetes mellitus, uncontrolled      Past Surgical History:   Procedure Laterality Date    WISDOM TOOTH EXTRACTION       Social History     Socioeconomic History    Marital status: Single   Tobacco Use    Smoking status: Never    Smokeless tobacco: Never   Vaping Use    Vaping Use: Some days    Substances: Nicotine, Flavoring    Devices: Refillable tank   Substance and Sexual Activity    Alcohol use: Defer    Drug use: Defer    Sexual activity: Defer     Family History   Problem Relation Age of Onset    Diabetes Mother     Diabetes Maternal Grandmother     Diabetes Maternal Grandfather     Diabetes Paternal Grandmother        Review of Systems:   Review of Systems      All other systems were reviewed and are negative.  Exceptions are noted in the subjective or above.      Objective     Vital Signs  Vitals:    11/15/23 1525   BP: 120/73   Pulse: 89   Resp: 18   Temp: 97.6 °F (36.4 °C)   SpO2: 97%       Body mass index is  32.39 kg/m².  BMI is >= 30 and <35. (Class 1 Obesity). The following options were offered after discussion;: exercise counseling/recommendations and nutrition counseling/recommendations      Physical Exam  Vitals and nursing note reviewed.   Constitutional:       General: She is not in acute distress.     Appearance: She is well-developed.   Cardiovascular:      Rate and Rhythm: Normal rate and regular rhythm.      Pulses: Normal pulses.      Heart sounds: Normal heart sounds.   Pulmonary:      Effort: Pulmonary effort is normal.      Breath sounds: Normal breath sounds. No wheezing.   Chest:      Chest wall: No tenderness.   Skin:     General: Skin is warm and dry.      Capillary Refill: Capillary refill takes less than 2 seconds.      Coloration: Skin is not pale.      Findings: No erythema or rash.   Neurological:      Mental Status: She is alert and oriented to person, place, and time.   Psychiatric:         Behavior: Behavior normal.         Thought Content: Thought content normal.         Judgment: Judgment normal.              Results Review:    I reviewed the patient's new clinical results.   Office Visit on 11/15/2023   Component Date Value Ref Range Status    Hemoglobin A1C 11/15/2023 9.4 (A)  4.5 - 5.7 % Final    Lot Number 11/15/2023 [id number]   Final    Expiration Date 11/15/2023 7/2/25   Final    Microalbumin, Urine 11/15/2023 10   Final    Creatinine, Urine 11/15/2023 200   Final         Medication Review:     Current Outpatient Medications:     Accu-Chek Softclix Lancets lancets, Accu-Chek Softclix Lancets, Disp: , Rfl:     Continuous Blood Gluc  (Dexcom G6 ) device, Use 1 each See Admin Instructions. Use as directed, Disp: 1 each, Rfl: 0    Continuous Blood Gluc Transmit (Dexcom G6 Transmitter) misc, Use 1 each Every 3 (Three) Months., Disp: 1 each, Rfl: 3    fluticasone (Flonase) 50 MCG/ACT nasal spray, 2 sprays into the nostril(s) as directed by provider Daily., Disp: 15.8 mL,  Rfl: 3    Glucagon (Gvoke HypoPen 2-Pack) 1 MG/0.2ML solution auto-injector, Inject 0.2 mL under the skin into the appropriate area as directed 1 (One) Time As Needed (hypoglycemia) for up to 1 dose., Disp: 0.2 mL, Rfl: 3    Insulin Disposable Pump (Omnipod 5 G6 Pod, Gen 5,) misc, Inject 1 Device under the skin into the appropriate area as directed Daily., Disp: 5 each, Rfl: 6    NovoLOG 100 UNIT/ML injection, INJECT UP  UNITS DAILY VIA INSULIN PUMP., Disp: , Rfl:     sertraline (ZOLOFT) 50 MG tablet, Take 1 tablet by mouth Daily., Disp: 90 tablet, Rfl: 3    valACYclovir (Valtrex) 1000 MG tablet, Take 1 tablet by mouth 2 (Two) Times a Day., Disp: 10 tablet, Rfl: 1    Continuous Blood Gluc Sensor (Dexcom G7 Sensor) misc, Use 1 each Every 10 (Ten) Days., Disp: 1 each, Rfl: 0    Continuous Blood Gluc Sensor (Dexcom G7 Sensor) misc, Use 1 each Every 10 (Ten) Days., Disp: 9 each, Rfl: 3    FREESTYLE TEST STRIPS test strip, Test daily, Disp: 50 each, Rfl: 3    Diagnoses and all orders for this visit:    Type 1 diabetes mellitus with hyperglycemia  -     NovoLOG 100 UNIT/ML injection; INJECT UP  UNITS DAILY VIA INSULIN PUMP.  -     Continuous Blood Gluc  (Dexcom G6 ) device; Use 1 each See Admin Instructions. Use as directed  -     Continuous Blood Gluc Transmit (Dexcom G6 Transmitter) misc; Use 1 each Every 3 (Three) Months.  -     Insulin Disposable Pump (Omnipod 5 G6 Pod, Gen 5,) misc; Inject 1 Device under the skin into the appropriate area as directed Daily.  -     Glucagon (Gvoke HypoPen 2-Pack) 1 MG/0.2ML solution auto-injector; Inject 0.2 mL under the skin into the appropriate area as directed 1 (One) Time As Needed (hypoglycemia) for up to 1 dose.  -     POC Glycosylated Hemoglobin (Hb A1C)  -     POCT microalbumin  -     Discontinue: FREESTYLE TEST STRIPS test strip; Check blood sugar 5x daily  -     Continuous Blood Gluc Sensor (Dexcom G7 Sensor) misc; Use 1 each Every 10 (Ten)  Days.    Anxiety  -     GeneSight - Swab, Oral Cavity; Future    Moderate episode of recurrent major depressive disorder  -     sertraline (ZOLOFT) 50 MG tablet; Take 1 tablet by mouth Daily.  -     GeneSight - Swab, Oral Cavity; Future    Other orders  -     Discontinue: Glucagon (Gvoke HypoPen 2-Pack) 1 MG/0.2ML solution auto-injector; Inject 0.2 mL under the skin into the appropriate area as directed.  -     Discontinue: Insulin Disposable Pump (Omnipod 5 G6 Pod, Gen 5,) misc; INJECT 1 EACH INTO THE SKIN EVERY OTHER DAY. FAX  [phone]  -     Discontinue: famotidine (PEPCID) 10 MG tablet; Take 1 tablet by mouth Daily.  -     Discontinue: sertraline (ZOLOFT) 50 MG tablet; Take 1 tablet by mouth Daily.  -     Discontinue: Continuous Blood Gluc Sensor (Dexcom G6 Sensor); Use Every 10 (Ten) Days.        Plan:    1. Medication refill.  I will increase the Zoloft to 75 mg today on 11/15/2023.       Return if symptoms worsen or fail to improve, for Annual.    MELVIN Aguirre  11/15/2023          Transcribed from ambient dictation for MELVIN Aguirre by Savannah Sapp.  11/15/23   18:36 EST    Patient or patient representative verbalized consent to the visit recording.  I have personally performed the services described in this document as transcribed by the above individual, and it is both accurate and complete.

## 2023-11-15 NOTE — TELEPHONE ENCOUNTER
Fariba Bronson ([place]: [id number]) - [id number]  Dexcom G7 Sensor  Status: PA Response - Approved

## 2023-11-16 DIAGNOSIS — E10.65 TYPE 1 DIABETES MELLITUS WITH HYPERGLYCEMIA: ICD-10-CM

## 2023-11-16 RX ORDER — BLOOD SUGAR DIAGNOSTIC
STRIP MISCELLANEOUS
Qty: 50 EACH | Refills: 3 | Status: SHIPPED | OUTPATIENT
Start: 2023-11-16

## 2023-11-21 DIAGNOSIS — F33.1 MODERATE EPISODE OF RECURRENT MAJOR DEPRESSIVE DISORDER: ICD-10-CM

## 2023-11-21 DIAGNOSIS — F41.9 ANXIETY: ICD-10-CM

## 2024-01-18 RX ORDER — VENLAFAXINE HYDROCHLORIDE 37.5 MG/1
37.5 CAPSULE, EXTENDED RELEASE ORAL DAILY
Qty: 30 CAPSULE | Refills: 1 | Status: SHIPPED | OUTPATIENT
Start: 2024-01-18

## 2024-01-22 ENCOUNTER — TELEPHONE (OUTPATIENT)
Dept: INTERNAL MEDICINE | Facility: CLINIC | Age: 34
End: 2024-01-22
Payer: MEDICAID

## 2024-01-22 NOTE — TELEPHONE ENCOUNTER
Caller: Fariba Bronson    Relationship: Self    Best call back number: [phone]     Which medication are you concerned about: venlafaxine XR (Effexor XR) 37.5 MG 24 hr capsule   AND sertraline (ZOLOFT) 50 MG tablet     Who prescribed you this medication: MELVIN RAMOS     When did you start taking this medication: HAS BEEN ON SERTRALINE FOR 7 YEARS AND HAS NOT STARTED VENLAFAXINE YET     What are your concerns: THE PATIENT STATED SHE HAS BEEN WEANING HERSELF OFF OF THE SERTRALINE AND WANTS KNOW WHEN SHE SHOULD START TAKING HER VENLAFAXINE. THE PATIENT ALSO WANTS TO KNOW IF SHE SHOULD TAKE THEM BOTH TOGETHER AT FIRST OR SHOULD SHE STOP TAKING THE SERTRALINE AND ONLY TAKE THE VENLAFAXINE.    PLEASE CALL AND ADVISE.     How long have you had these concerns: 01/22/2024

## 2024-01-24 NOTE — TELEPHONE ENCOUNTER
I contacted patient and discussed medication and advised her to go ahead and start taking the Effexor as she continues to wean off the Zoloft offered BuSpar but patient indicated she did not feel like she would need it at this time told her to contact the office if symptoms worsened she stated understanding.

## 2024-02-06 ENCOUNTER — OFFICE VISIT (OUTPATIENT)
Dept: ENDOCRINOLOGY | Facility: CLINIC | Age: 34
End: 2024-02-06
Payer: MEDICAID

## 2024-02-06 VITALS
HEIGHT: 68 IN | HEART RATE: 100 BPM | DIASTOLIC BLOOD PRESSURE: 76 MMHG | OXYGEN SATURATION: 99 % | SYSTOLIC BLOOD PRESSURE: 114 MMHG | WEIGHT: 211 LBS | BODY MASS INDEX: 31.98 KG/M2

## 2024-02-06 DIAGNOSIS — E10.65 TYPE 1 DIABETES MELLITUS WITH HYPERGLYCEMIA: Primary | ICD-10-CM

## 2024-02-06 LAB
EXPIRATION DATE: ABNORMAL
EXPIRATION DATE: ABNORMAL
GLUCOSE BLDC GLUCOMTR-MCNC: 182 MG/DL (ref 70–130)
HBA1C MFR BLD: 9.4 % (ref 4.5–5.7)
Lab: ABNORMAL
Lab: ABNORMAL

## 2024-02-06 RX ORDER — INSULIN ASPART 100 [IU]/ML
INJECTION, SOLUTION INTRAVENOUS; SUBCUTANEOUS
Qty: 90 ML | Refills: 2 | Status: SHIPPED | OUTPATIENT
Start: 2024-02-06

## 2024-02-06 RX ORDER — INSULIN PMP CART,AUT,G6/7,CNTR
1 EACH SUBCUTANEOUS EVERY OTHER DAY
Qty: 15 EACH | Refills: 6 | Status: SHIPPED | OUTPATIENT
Start: 2024-02-06

## 2024-02-06 RX ORDER — PROCHLORPERAZINE 25 MG/1
1 SUPPOSITORY RECTAL
Qty: 1 EACH | Refills: 3 | Status: SHIPPED | OUTPATIENT
Start: 2024-02-06

## 2024-02-06 RX ORDER — PROCHLORPERAZINE 25 MG/1
SUPPOSITORY RECTAL
Qty: 3 EACH | Refills: 11 | Status: SHIPPED | OUTPATIENT
Start: 2024-02-06
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
## 2024-02-13 RX ORDER — VENLAFAXINE HYDROCHLORIDE 37.5 MG/1
37.5 CAPSULE, EXTENDED RELEASE ORAL DAILY
Qty: 90 CAPSULE | Refills: 0 | Status: SHIPPED | OUTPATIENT
Start: 2024-02-13

## 2024-05-03 RX ORDER — VENLAFAXINE HYDROCHLORIDE 37.5 MG/1
37.5 CAPSULE, EXTENDED RELEASE ORAL DAILY
Qty: 90 CAPSULE | Refills: 3 | Status: SHIPPED | OUTPATIENT
Start: 2024-05-03

## 2024-05-31 ENCOUNTER — OFFICE VISIT (OUTPATIENT)
Dept: INTERNAL MEDICINE | Facility: CLINIC | Age: 34
End: 2024-05-31
Payer: MEDICAID

## 2024-05-31 VITALS
HEIGHT: 68 IN | RESPIRATION RATE: 14 BRPM | HEART RATE: 101 BPM | DIASTOLIC BLOOD PRESSURE: 74 MMHG | OXYGEN SATURATION: 98 % | TEMPERATURE: 97.1 F | WEIGHT: 202 LBS | BODY MASS INDEX: 30.62 KG/M2 | SYSTOLIC BLOOD PRESSURE: 112 MMHG

## 2024-05-31 DIAGNOSIS — F41.9 ANXIETY: ICD-10-CM

## 2024-05-31 DIAGNOSIS — F33.1 MODERATE EPISODE OF RECURRENT MAJOR DEPRESSIVE DISORDER: ICD-10-CM

## 2024-05-31 DIAGNOSIS — N89.8 VAGINAL DISCHARGE: ICD-10-CM

## 2024-05-31 DIAGNOSIS — J02.9 SORE THROAT: Primary | ICD-10-CM

## 2024-05-31 LAB
EXPIRATION DATE: NORMAL
INTERNAL CONTROL: NORMAL
Lab: NORMAL
S PYO AG THROAT QL: NEGATIVE

## 2024-05-31 PROCEDURE — 99214 OFFICE O/P EST MOD 30 MIN: CPT | Performed by: NURSE PRACTITIONER

## 2024-05-31 PROCEDURE — 87880 STREP A ASSAY W/OPTIC: CPT | Performed by: NURSE PRACTITIONER

## 2024-05-31 PROCEDURE — 1126F AMNT PAIN NOTED NONE PRSNT: CPT | Performed by: NURSE PRACTITIONER

## 2024-05-31 PROCEDURE — 3046F HEMOGLOBIN A1C LEVEL >9.0%: CPT | Performed by: NURSE PRACTITIONER

## 2024-05-31 PROCEDURE — 1160F RVW MEDS BY RX/DR IN RCRD: CPT | Performed by: NURSE PRACTITIONER

## 2024-05-31 PROCEDURE — 1159F MED LIST DOCD IN RCRD: CPT | Performed by: NURSE PRACTITIONER

## 2024-05-31 RX ORDER — CITALOPRAM HYDROBROMIDE 10 MG/1
10 TABLET ORAL DAILY
Qty: 30 TABLET | Refills: 1 | Status: SHIPPED | OUTPATIENT
Start: 2024-05-31

## 2024-05-31 RX ORDER — DOXYCYCLINE 100 MG/1
100 TABLET ORAL 2 TIMES DAILY
Qty: 20 TABLET | Refills: 0 | Status: SHIPPED | OUTPATIENT
Start: 2024-05-31 | End: 2024-06-10

## 2024-05-31 NOTE — PROGRESS NOTES
Chief Complaint / Reason:      Chief Complaint   Patient presents with    Sore Throat     X1 day       Subjective     History of Present Illness  The patient is a 32-year-old female who is here with complaints of sore throat.    The patient presents with a persistent sore throat, despite the absence of fever or chills. She reports enlarged tonsils and a hoarse voice.    The patient has been experiencing vaginal discharge for the past 2 days, which she suspects may be bacterial vaginosis. She denies any associated itching.    The patient has been on a double dose of Effexor, which she reports as beneficial. However, she notes that her menstrual cycle ceases, followed by a week-long spotting, which does not necessitate any protective measures. This issue has been ongoing since the initiation of Effexor, initially in the first month. However, this time, her menstrual cycle ceased, but resumed 4 days later. She suspects she may have Obsessive-Compulsive Disorder (OCD), as evidenced by increased agitation and intrusive thoughts.    History taken from: patient    PMH/FH/Social History were reviewed and updated appropriately in the electronic medical record.   Past Medical History:   Diagnosis Date    Diabetes mellitus     GERD (gastroesophageal reflux disease)     HSV (herpes simplex virus) anogenital infection     Insulin pump in place     Obesity     body mass index 30+-obesity    Type 1 diabetes mellitus, uncontrolled      Past Surgical History:   Procedure Laterality Date     SECTION      12/15/2022    WISDOM TOOTH EXTRACTION       Social History     Socioeconomic History    Marital status: Single   Tobacco Use    Smoking status: Never    Smokeless tobacco: Never   Vaping Use    Vaping status: Some Days    Substances: Nicotine, Flavoring    Devices: Refillable tank    Passive vaping exposure: Yes   Substance and Sexual Activity    Alcohol use: Never    Drug use: Never    Sexual activity: Defer     Family  History   Problem Relation Age of Onset    Hypertension Mother     Thyroid disease Mother     Diabetes Father     Ulcerative colitis Father     Diabetes Maternal Grandmother     Diabetes Maternal Grandfather     Diabetes Paternal Grandmother        Review of Systems:   Review of Systems      All other systems were reviewed and are negative.  Exceptions are noted in the subjective or above.      Objective     Vital Signs  Vitals:    05/31/24 1545   BP: 112/74   Pulse: 101   Resp: 14   Temp: 97.1 °F (36.2 °C)   SpO2: 98%       Body mass index is 30.71 kg/m².  BMI is >= 30 and <35. (Class 1 Obesity). The following options were offered after discussion;: exercise counseling/recommendations and nutrition counseling/recommendations       Physical Exam  Vitals and nursing note reviewed.   Constitutional:       General: She is not in acute distress.     Appearance: She is well-developed. She is obese.   HENT:      Head: Normocephalic and atraumatic.      Right Ear: Ear canal and external ear normal. Tympanic membrane is erythematous and bulging.      Left Ear: Ear canal and external ear normal. Tympanic membrane is erythematous and bulging.      Nose: Mucosal edema present. No rhinorrhea.      Right Sinus: No maxillary sinus tenderness or frontal sinus tenderness.      Left Sinus: No maxillary sinus tenderness or frontal sinus tenderness.      Mouth/Throat:      Mouth: Mucous membranes are dry.      Pharynx: Posterior oropharyngeal erythema present.      Comments: PND    Eyes:      Conjunctiva/sclera: Conjunctivae normal.   Cardiovascular:      Rate and Rhythm: Normal rate and regular rhythm.      Heart sounds: Normal heart sounds.   Pulmonary:      Effort: Pulmonary effort is normal. No respiratory distress.      Breath sounds: Normal breath sounds.   Lymphadenopathy:      Head:      Right side of head: No submental, submandibular or tonsillar adenopathy.      Left side of head: No submental, submandibular or tonsillar  adenopathy.      Cervical: No cervical adenopathy.   Skin:     General: Skin is warm and dry.      Capillary Refill: Capillary refill takes less than 2 seconds.      Findings: No rash.   Neurological:      Mental Status: She is alert and oriented to person, place, and time.   Psychiatric:         Behavior: Behavior normal.         Thought Content: Thought content normal.         Judgment: Judgment normal.              Results Review:    I reviewed the patient's new clinical results.   Office Visit on 05/31/2024   Component Date Value Ref Range Status    Rapid Strep A Screen 05/31/2024 Negative  Negative, VALID, INVALID, Not Performed Final    Internal Control 05/31/2024 Passed  Passed Final    Lot Number 05/31/2024 [id number]   Final    Expiration Date 05/31/2024 10-   Final    Atopobium Vaginae 05/31/2024 High - 2 (A)  Score Final    BVAB 2 05/31/2024 High - 2 (A)  Score Final    Megasphaera 1 05/31/2024 High - 2 (A)  Score Final    Comment: Calculate total score by adding the 3 individual bacterial  vaginosis (BV) marker scores together.  Total score is  interpreted as follows:  Total score 0-1: Indicates the absence of BV.  Total score   2: Indeterminate for BV. Additional clinical                   data should be evaluated to establish a                   diagnosis.  Total score 3-6: Indicates the presence of BV.      Brianda Albicans, MISAEL 05/31/2024 Negative  Negative Final    Brianda Glabrata, MISAEL 05/31/2024 Negative  Negative Final    Trichomonas vaginosis 05/31/2024 Negative  Negative Final    Chlamydia trachomatis, MISAEL 05/31/2024 Negative  Negative Final    Neisseria gonorrhoeae, MISAEL 05/31/2024 Negative  Negative Final    HSV 1 MISAEL 05/31/2024 Negative  Negative Final    HSV 2 MISAEL 05/31/2024 Negative  Negative Final    Mycoplasma genitalium NA 05/31/2024 Negative  Negative Final    Mycoplasma hominis 05/31/2024 Negative  Negative Final    Ureaplasma spp 05/31/2024 Positive (A)  Negative Final          Medication Review:     Current Outpatient Medications:     Accu-Chek Softclix Lancets lancets, Accu-Chek Softclix Lancets, Disp: , Rfl:     Continuous Blood Gluc  (Dexcom G6 ) device, Use 1 each See Admin Instructions. Use as directed, Disp: 1 each, Rfl: 0    Continuous Blood Gluc Sensor (Dexcom G6 Sensor), Use Every 10 (Ten) Days. Use as directed change every 10 days, Disp: 3 each, Rfl: 11    fluticasone (Flonase) 50 MCG/ACT nasal spray, 2 sprays into the nostril(s) as directed by provider Daily., Disp: 15.8 mL, Rfl: 3    FREESTYLE TEST STRIPS test strip, Test daily, Disp: 50 each, Rfl: 3    Glucagon (Gvoke HypoPen 2-Pack) 1 MG/0.2ML solution auto-injector, Inject 0.2 mL under the skin into the appropriate area as directed 1 (One) Time As Needed (hypoglycemia) for up to 1 dose., Disp: 0.2 mL, Rfl: 3    Insulin Disposable Pump (Omnipod 5 G6 Pod, Gen 5,) misc, Inject 1 Device under the skin into the appropriate area as directed Every Other Day., Disp: 15 each, Rfl: 6    NovoLOG 100 UNIT/ML injection, Use as directed in insulin pump  units/day, Disp: 90 mL, Rfl: 2    valACYclovir (Valtrex) 1000 MG tablet, Take 1 tablet by mouth 2 (Two) Times a Day., Disp: 10 tablet, Rfl: 1    venlafaxine XR (Effexor XR) 37.5 MG 24 hr capsule, Take 1 capsule by mouth Daily. (Patient taking differently: Take 2 capsules by mouth Daily.), Disp: 90 capsule, Rfl: 3    citalopram (CeleXA) 10 MG tablet, Take 1 tablet by mouth Daily., Disp: 30 tablet, Rfl: 1    Continuous Blood Gluc Transmit (Dexcom G6 Transmitter) misc, Use 1 each Every 3 (Three) Months., Disp: 1 each, Rfl: 3    doxycycline (ADOXA) 100 MG tablet, Take 1 tablet by mouth 2 (Two) Times a Day for 10 days., Disp: 20 tablet, Rfl: 0    Diagnoses and all orders for this visit:    Sore throat  -     POCT rapid strep A  -     doxycycline (ADOXA) 100 MG tablet; Take 1 tablet by mouth 2 (Two) Times a Day for 10 days.    Vaginal discharge  -     NuSwab VG+ &  HSV  -     Genital Mycoplasmas MISAEL, Swab - Swab, Vagina    Anxiety  -     citalopram (CeleXA) 10 MG tablet; Take 1 tablet by mouth Daily.    Moderate episode of recurrent major depressive disorder  -     citalopram (CeleXA) 10 MG tablet; Take 1 tablet by mouth Daily.      Assessment & Plan  1. Pharyngitis.  The patient's streptococcal pharyngitis test yielded a negative result. The patient is advised to perform saltwater gargles and to utilize a nightly mouthwash.    2. Vaginal discharge.  A vaginal swab was collected today for further analysis.    3. Menstrual irregularities.  The patient's menstrual irregularities could potentially be indicative of bacterial vaginosis. The patient is advised to maintain her scheduled appointment with her Obstetrician-gynecologist. A transvaginal ultrasound may be necessary to identify any cysts or other potential causes.    4. Obsessive-Compulsive Disorder (OCD).  The patient will be prescribed Celexa 10 mg in conjunction with Effexor 37.5 mg.    Follow-up  The patient is scheduled for a virtual follow-up visit in 4 weeks.    Return in about 4 weeks (around 6/28/2024), or if symptoms worsen or fail to improve.    MELVIN Aguirre  05/31/2024    Patient or patient representative verbalized consent for the use of Ambient Listening during the visit with  MELVIN Aguirre for chart documentation. 6/9/2024  21:30 EDT

## 2024-06-05 LAB
A VAGINAE DNA VAG QL NAA+PROBE: ABNORMAL SCORE
BVAB2 DNA VAG QL NAA+PROBE: ABNORMAL SCORE
C ALBICANS DNA VAG QL NAA+PROBE: NEGATIVE
C GLABRATA DNA VAG QL NAA+PROBE: NEGATIVE
C TRACH DNA VAG QL NAA+PROBE: NEGATIVE
HSV1 DNA SPEC QL NAA+PROBE: NEGATIVE
HSV2 DNA SPEC QL NAA+PROBE: NEGATIVE
M GENITALIUM DNA SPEC QL NAA+PROBE: NEGATIVE
M HOMINIS DNA SPEC QL NAA+PROBE: NEGATIVE
MEGA1 DNA VAG QL NAA+PROBE: ABNORMAL SCORE
N GONORRHOEA DNA VAG QL NAA+PROBE: NEGATIVE
T VAGINALIS DNA VAG QL NAA+PROBE: NEGATIVE
UREAPLASMA DNA SPEC QL NAA+PROBE: POSITIVE

## 2024-06-10 RX ORDER — METRONIDAZOLE 500 MG/1
500 TABLET ORAL EVERY 12 HOURS
Qty: 14 TABLET | Refills: 0 | Status: SHIPPED | OUTPATIENT
Start: 2024-06-10 | End: 2024-06-17

## 2024-06-20 ENCOUNTER — OFFICE VISIT (OUTPATIENT)
Dept: ENDOCRINOLOGY | Facility: CLINIC | Age: 34
End: 2024-06-20
Payer: MEDICAID

## 2024-06-20 VITALS
SYSTOLIC BLOOD PRESSURE: 112 MMHG | DIASTOLIC BLOOD PRESSURE: 70 MMHG | HEIGHT: 68 IN | WEIGHT: 204.8 LBS | BODY MASS INDEX: 31.04 KG/M2 | HEART RATE: 90 BPM | OXYGEN SATURATION: 98 %

## 2024-06-20 DIAGNOSIS — E10.65 TYPE 1 DIABETES MELLITUS WITH HYPERGLYCEMIA: Primary | ICD-10-CM

## 2024-06-20 LAB
ALBUMIN SERPL-MCNC: 3.8 G/DL (ref 3.5–5.2)
ALBUMIN UR-MCNC: <1.2 MG/DL
ALBUMIN/GLOB SERPL: 1.5 G/DL
ALP SERPL-CCNC: 73 U/L (ref 39–117)
ALT SERPL W P-5'-P-CCNC: 7 U/L (ref 1–33)
ANION GAP SERPL CALCULATED.3IONS-SCNC: 9.1 MMOL/L (ref 5–15)
AST SERPL-CCNC: 8 U/L (ref 1–32)
BILIRUB SERPL-MCNC: 0.5 MG/DL (ref 0–1.2)
BUN SERPL-MCNC: 10 MG/DL (ref 6–20)
BUN/CREAT SERPL: 13.3 (ref 7–25)
CALCIUM SPEC-SCNC: 8.7 MG/DL (ref 8.6–10.5)
CHLORIDE SERPL-SCNC: 103 MMOL/L (ref 98–107)
CO2 SERPL-SCNC: 23.9 MMOL/L (ref 22–29)
CREAT SERPL-MCNC: 0.75 MG/DL (ref 0.57–1)
CREAT UR-MCNC: 229.9 MG/DL
EGFRCR SERPLBLD CKD-EPI 2021: 109.3 ML/MIN/1.73
EXPIRATION DATE: ABNORMAL
EXPIRATION DATE: ABNORMAL
GLOBULIN UR ELPH-MCNC: 2.5 GM/DL
GLUCOSE BLDC GLUCOMTR-MCNC: 209 MG/DL (ref 70–130)
GLUCOSE SERPL-MCNC: 226 MG/DL (ref 65–99)
HBA1C MFR BLD: 9 % (ref 4.5–5.7)
Lab: ABNORMAL
Lab: ABNORMAL
MICROALBUMIN/CREAT UR: NORMAL MG/G{CREAT}
POTASSIUM SERPL-SCNC: 4 MMOL/L (ref 3.5–5.2)
PROT SERPL-MCNC: 6.3 G/DL (ref 6–8.5)
SODIUM SERPL-SCNC: 136 MMOL/L (ref 136–145)
TSH SERPL DL<=0.05 MIU/L-ACNC: 0.97 UIU/ML (ref 0.27–4.2)

## 2024-06-20 PROCEDURE — 84443 ASSAY THYROID STIM HORMONE: CPT | Performed by: PHYSICIAN ASSISTANT

## 2024-06-20 PROCEDURE — 80053 COMPREHEN METABOLIC PANEL: CPT | Performed by: PHYSICIAN ASSISTANT

## 2024-06-20 PROCEDURE — 82043 UR ALBUMIN QUANTITATIVE: CPT | Performed by: PHYSICIAN ASSISTANT

## 2024-06-20 PROCEDURE — 82570 ASSAY OF URINE CREATININE: CPT | Performed by: PHYSICIAN ASSISTANT

## 2024-06-20 NOTE — PROGRESS NOTES
"     Office Note      Date: 2024  Patient Name: Fariba Bronson  MRN: [id number]  : 1990    Chief Complaint   Patient presents with    Diabetes       History of Present Illness:   Fariba Bronson is a 33 y.o. female who presents for follow-up for type 1 diabetes diagnosed .  She remains on the OmniPod 5 insulin pump with Dexcom continuous glucose monitor.  She just filled her prescription and they sent G7 sensors instead of the G6 sensors.  She reports she has not been connected to her pump and her blood sugars have not been as well-controlled for the last several weeks.  She reports she is also on her period and needs to add an additional basal profile with higher rates during this time.  She reports recently her blood sugars have been high.  She forgot to fast today for labs.  She denies any trouble with her feet today.  She is overdue for her eye exam and will work on getting this scheduled.      Subjective     Review of Systems:   Review of Systems   Constitutional: Negative.    Cardiovascular: Negative.    Gastrointestinal: Negative.    Endocrine: Negative.    Neurological: Negative.        The following portions of the patient's history were reviewed and updated as appropriate: allergies, current medications, past family history, past medical history, past social history, past surgical history, and problem list.    Objective     Vitals:    24 0855   BP: 112/70   BP Location: Right arm   Patient Position: Sitting   Cuff Size: Adult   Pulse: 90   SpO2: 98%   Weight: 92.9 kg (204 lb 12.8 oz)   Height: 172.7 cm (68\")   PainSc: 0-No pain     Body mass index is 31.14 kg/m².    Physical Exam  Vitals reviewed.   Constitutional:       General: She is not in acute distress.     Appearance: Normal appearance.   Cardiovascular:      Pulses:           Dorsalis pedis pulses are 2+ on the right side and 2+ on the left side.        Posterior tibial pulses are 2+ on the right side and 2+ on the " left side.   Musculoskeletal:      Right foot: No deformity.      Left foot: No deformity.   Feet:      Right foot:      Protective Sensation: 5 sites tested.  5 sites sensed.      Skin integrity: Skin integrity normal.      Toenail Condition: Right toenails are normal.      Left foot:      Protective Sensation: 5 sites tested.  5 sites sensed.      Skin integrity: Skin integrity normal.      Toenail Condition: Left toenails are normal.      Comments: Diabetic Foot Exam Performed and Monofilament Test Performed      Neurological:      Mental Status: She is alert.         HEMOGLOBIN A1C  Lab Results   Component Value Date    HGBA1C 9.0 (A) 06/20/2024       GLUCOSE  Glucose   Date Value Ref Range Status   06/20/2024 209 (A) 70 - 130 mg/dL Final   12/17/2022 74 70 - 99 mg/dL Final           Assessment / Plan      Assessment & Plan:  1. Type 1 diabetes mellitus with hyperglycemia  Her hemoglobin A1c has improved slightly as compared to February but is still well above goal at 9.0%.  Unfortunately there are no continuous glucose monitor readings for review but I did review her OmniPod with entered blood glucose readings and settings.  We changed her correction factor from 25-20 to try to prevent hyperglycemia.  We also added an additional basal profile with higher basal rates when she is on her period.  I encouraged her to get back on her Dexcom G6 as soon as possible so this will connect with her OmniPod insulin pump.  She will reach out to us if she continues to have trouble so we can review her pump download and make further adjustments.  Her weight is down 7 pounds since her appointment in February.  I encouraged healthy eating habits and physical activity as tolerated.  CMP, TSH and urine microalbumin/creatinine ratio pending today.  Will send note with results and plan.  She is not fasting today so we will plan to check cholesterol numbers in the future.  Her blood pressure is excellent today.  I encouraged her to  get her eye exam scheduled.  - POC Glycosylated Hemoglobin (Hb A1C)  - POC Glucose, Blood  - TSH; Future  - Comprehensive Metabolic Panel; Future  - Microalbumin / Creatinine Urine Ratio - Urine, Clean Catch; Future  - TSH  - Comprehensive Metabolic Panel  - Microalbumin / Creatinine Urine Ratio - Urine, Clean Catch      Return in about 4 months (around 10/20/2024).     This note was dictated using Dragon voice recognition.    Electronically signed by: ELISA Meza  06/20/2024

## 2024-07-01 ENCOUNTER — TELEMEDICINE (OUTPATIENT)
Dept: INTERNAL MEDICINE | Facility: CLINIC | Age: 34
End: 2024-07-01
Payer: MEDICAID

## 2024-07-01 DIAGNOSIS — F33.1 MODERATE EPISODE OF RECURRENT MAJOR DEPRESSIVE DISORDER: ICD-10-CM

## 2024-07-01 DIAGNOSIS — F41.9 ANXIETY: Primary | ICD-10-CM

## 2024-07-01 PROCEDURE — 3052F HG A1C>EQUAL 8.0%<EQUAL 9.0%: CPT | Performed by: NURSE PRACTITIONER

## 2024-07-01 PROCEDURE — 1160F RVW MEDS BY RX/DR IN RCRD: CPT | Performed by: NURSE PRACTITIONER

## 2024-07-01 PROCEDURE — 1159F MED LIST DOCD IN RCRD: CPT | Performed by: NURSE PRACTITIONER

## 2024-07-01 PROCEDURE — 99213 OFFICE O/P EST LOW 20 MIN: CPT | Performed by: NURSE PRACTITIONER

## 2024-07-01 PROCEDURE — 1126F AMNT PAIN NOTED NONE PRSNT: CPT | Performed by: NURSE PRACTITIONER

## 2024-07-01 RX ORDER — CITALOPRAM 20 MG/1
20 TABLET ORAL DAILY
Qty: 90 TABLET | Refills: 3 | Status: SHIPPED | OUTPATIENT
Start: 2024-07-01

## 2024-07-01 NOTE — PROGRESS NOTES
You have chosen to receive care through a telehealth visit.  Do you consent to use a video/audio connection for your medical care today? Yes  Active Parties: Marilin CHARLES (work), Tyler Edouard Ma (work) and patient (home)      Chief Complaint / Reason:      Chief Complaint   Patient presents with    Anxiety     F/u       Subjective     History of Present Illness    Presents today for follow-up regarding anxiety via video visit.  Denies SI or HI.  She denies any side effects to current medication regimen and is wondering if we can increase dosage as she is taking 10 mg of Celexa.  History taken from: patient    PMH/FH/Social History were reviewed and updated appropriately in the electronic medical record.   Past Medical History:   Diagnosis Date    Diabetes mellitus     GERD (gastroesophageal reflux disease)     HSV (herpes simplex virus) anogenital infection     Insulin pump in place     Obesity     body mass index 30+-obesity    Type 1 diabetes mellitus, uncontrolled      Past Surgical History:   Procedure Laterality Date     SECTION      12/15/2022    WISDOM TOOTH EXTRACTION       Social History     Socioeconomic History    Marital status: Single   Tobacco Use    Smoking status: Never    Smokeless tobacco: Never   Vaping Use    Vaping status: Some Days    Substances: Nicotine, Flavoring    Devices: Refillable tank    Passive vaping exposure: Yes   Substance and Sexual Activity    Alcohol use: Never    Drug use: Never    Sexual activity: Defer     Family History   Problem Relation Age of Onset    Hypertension Mother     Thyroid disease Mother     Diabetes Father     Ulcerative colitis Father     Diabetes Maternal Grandmother     Diabetes Maternal Grandfather     Diabetes Paternal Grandmother        Review of Systems:   Review of Systems      All other systems were reviewed and are negative.  Exceptions are noted in the subjective or above.      Objective     Vital Signs  There were no vitals filed  for this visit.    There is no height or weight on file to calculate BMI.         Physical Exam  Nursing note reviewed.   Constitutional:       General: She is not in acute distress.     Appearance: She is well-developed.   Pulmonary:      Effort: Pulmonary effort is normal. No respiratory distress.   Chest:      Chest wall: No tenderness.   Musculoskeletal:         General: Normal range of motion.   Skin:     General: Skin is warm and dry.      Capillary Refill: Capillary refill takes less than 2 seconds.   Neurological:      Mental Status: She is alert and oriented to person, place, and time.      Coordination: Coordination normal.   Psychiatric:         Behavior: Behavior normal.         Thought Content: Thought content normal.         Judgment: Judgment normal.              Results Review:    I reviewed the patient's new clinical results.       Medication Review:     Current Outpatient Medications:     Accu-Chek Softclix Lancets lancets, Accu-Chek Softclix Lancets, Disp: , Rfl:     Continuous Blood Gluc  (Dexcom G6 ) device, Use 1 each See Admin Instructions. Use as directed, Disp: 1 each, Rfl: 0    Continuous Blood Gluc Sensor (Dexcom G6 Sensor), Use Every 10 (Ten) Days. Use as directed change every 10 days, Disp: 3 each, Rfl: 11    Continuous Blood Gluc Transmit (Dexcom G6 Transmitter) misc, Use 1 each Every 3 (Three) Months., Disp: 1 each, Rfl: 3    fluticasone (Flonase) 50 MCG/ACT nasal spray, 2 sprays into the nostril(s) as directed by provider Daily., Disp: 15.8 mL, Rfl: 3    FREESTYLE TEST STRIPS test strip, Test daily, Disp: 50 each, Rfl: 3    Glucagon (Gvoke HypoPen 2-Pack) 1 MG/0.2ML solution auto-injector, Inject 0.2 mL under the skin into the appropriate area as directed 1 (One) Time As Needed (hypoglycemia) for up to 1 dose., Disp: 0.2 mL, Rfl: 3    Insulin Disposable Pump (Omnipod 5 G6 Pod, Gen 5,) misc, Inject 1 Device under the skin into the appropriate area as directed Every  Other Day., Disp: 15 each, Rfl: 6    NovoLOG 100 UNIT/ML injection, Use as directed in insulin pump  units/day, Disp: 90 mL, Rfl: 2    valACYclovir (Valtrex) 1000 MG tablet, Take 1 tablet by mouth 2 (Two) Times a Day., Disp: 10 tablet, Rfl: 1    citalopram (CeleXA) 20 MG tablet, Take 1 tablet by mouth Daily., Disp: 90 tablet, Rfl: 3    Diagnoses and all orders for this visit:    Anxiety  -     citalopram (CeleXA) 20 MG tablet; Take 1 tablet by mouth Daily.    Moderate episode of recurrent major depressive disorder  -     citalopram (CeleXA) 20 MG tablet; Take 1 tablet by mouth Daily.      Assessment & Plan      Return in about 4 weeks (around 7/29/2024), or if symptoms worsen or fail to improve.    Marilin Thakur, APRN  07/01/2024

## 2024-07-11 ENCOUNTER — OFFICE VISIT (OUTPATIENT)
Dept: OBSTETRICS AND GYNECOLOGY | Facility: CLINIC | Age: 34
End: 2024-07-11
Payer: MEDICAID

## 2024-07-11 VITALS — BODY MASS INDEX: 31.37 KG/M2 | WEIGHT: 207 LBS | HEIGHT: 68 IN

## 2024-07-11 DIAGNOSIS — B97.7 HPV IN FEMALE: ICD-10-CM

## 2024-07-11 DIAGNOSIS — Z01.419 WELL WOMAN EXAM WITH ROUTINE GYNECOLOGICAL EXAM: Primary | ICD-10-CM

## 2024-07-11 DIAGNOSIS — Z11.3 SCREENING EXAMINATION FOR STI: ICD-10-CM

## 2024-07-11 DIAGNOSIS — Z22.39 CARRIER OF UREAPLASMA UREALYTICUM: ICD-10-CM

## 2024-07-11 DIAGNOSIS — Z12.4 SCREENING FOR MALIGNANT NEOPLASM OF CERVIX: ICD-10-CM

## 2024-07-11 PROCEDURE — 99385 PREV VISIT NEW AGE 18-39: CPT | Performed by: OBSTETRICS & GYNECOLOGY

## 2024-07-11 PROCEDURE — 2014F MENTAL STATUS ASSESS: CPT | Performed by: OBSTETRICS & GYNECOLOGY

## 2024-07-16 LAB
A VAGINAE DNA VAG QL NAA+PROBE: ABNORMAL SCORE
BVAB2 DNA VAG QL NAA+PROBE: ABNORMAL SCORE
C ALBICANS DNA VAG QL NAA+PROBE: POSITIVE
C GLABRATA DNA VAG QL NAA+PROBE: NEGATIVE
C TRACH DNA SPEC QL NAA+PROBE: NEGATIVE
M GENITALIUM DNA SPEC QL NAA+PROBE: NEGATIVE
M HOMINIS DNA SPEC QL NAA+PROBE: NEGATIVE
MEGA1 DNA VAG QL NAA+PROBE: ABNORMAL SCORE
N GONORRHOEA DNA VAG QL NAA+PROBE: NEGATIVE
REF LAB TEST METHOD: NORMAL
T VAGINALIS DNA VAG QL NAA+PROBE: NEGATIVE
UREAPLASMA DNA SPEC QL NAA+PROBE: NEGATIVE

## 2024-07-17 DIAGNOSIS — B37.9 YEAST INFECTION: Primary | ICD-10-CM

## 2024-07-17 RX ORDER — FLUCONAZOLE 150 MG/1
TABLET ORAL
Qty: 2 TABLET | Refills: 0 | Status: SHIPPED | OUTPATIENT
Start: 2024-07-17

## 2024-07-22 PROBLEM — Z22.39 CARRIER OF UREAPLASMA UREALYTICUM: Status: ACTIVE | Noted: 2024-07-22

## 2024-07-22 NOTE — PROGRESS NOTES
Annual Well Woman Visit    Subjective   Chief Complaint   Patient presents with    Gynecologic Exam     Last pap , wants STD screening.     Fariba Bronson is a 33 y.o. year old  presenting to be seen for an annual well woman visit.    She would like full STI screening.  Recent Ureaplasma was treated.    She denies sexual dysfunction. She denies urinary complaints including incontinence.    OB Hx:  x 1    Past Medical History:   Diagnosis Date    Diabetes mellitus     GERD (gastroesophageal reflux disease)     HSV (herpes simplex virus) anogenital infection     Insulin pump in place     Obesity     body mass index 30+-obesity    Type 1 diabetes mellitus, uncontrolled      Past Surgical History:   Procedure Laterality Date     SECTION      12/15/2022    WISDOM TOOTH EXTRACTION       Family History   Problem Relation Age of Onset    Hypertension Mother     Thyroid disease Mother     Diabetes Father     Ulcerative colitis Father     Diabetes Maternal Grandmother     Diabetes Maternal Grandfather     Diabetes Paternal Grandmother      Social History     Tobacco Use    Smoking status: Never    Smokeless tobacco: Never   Vaping Use    Vaping status: Some Days    Substances: Nicotine, Flavoring    Devices: Refillable tank    Passive vaping exposure: Yes   Substance Use Topics    Alcohol use: Never    Drug use: Never     (Not in a hospital admission)    Patient has no known allergies.  Current Outpatient Medications on File Prior to Visit   Medication Sig Dispense Refill    Accu-Chek Softclix Lancets lancets Accu-Chek Softclix Lancets      citalopram (CeleXA) 20 MG tablet Take 1 tablet by mouth Daily. 90 tablet 3    Continuous Blood Gluc  (Dexcom G6 ) device Use 1 each See Admin Instructions. Use as directed 1 each 0    Continuous Blood Gluc Sensor (Dexcom G6 Sensor) Use Every 10 (Ten) Days. Use as directed change every 10 days 3 each 11    Continuous Blood Gluc Transmit (Dexcom  "G6 Transmitter) misc Use 1 each Every 3 (Three) Months. 1 each 3    fluticasone (Flonase) 50 MCG/ACT nasal spray 2 sprays into the nostril(s) as directed by provider Daily. 15.8 mL 3    FREESTYLE TEST STRIPS test strip Test daily 50 each 3    Glucagon (Gvoke HypoPen 2-Pack) 1 MG/0.2ML solution auto-injector Inject 0.2 mL under the skin into the appropriate area as directed 1 (One) Time As Needed (hypoglycemia) for up to 1 dose. 0.2 mL 3    Insulin Disposable Pump (Omnipod 5 G6 Pod, Gen 5,) misc Inject 1 Device under the skin into the appropriate area as directed Every Other Day. 15 each 6    NovoLOG 100 UNIT/ML injection Use as directed in insulin pump  units/day 90 mL 2    valACYclovir (Valtrex) 1000 MG tablet Take 1 tablet by mouth 2 (Two) Times a Day. 10 tablet 1     No current facility-administered medications on file prior to visit.     Social History    Tobacco Use      Smoking status: Never      Smokeless tobacco: Never      Review of Systems  Pertinent items are noted in HPI, all other systems were reviewed and negative       Objective   Ht 172.7 cm (68\")   Wt 93.9 kg (207 lb)   BMI 31.47 kg/m²     Physical Exam:  General Appearance: alert, pleasant, appears stated age, interactive and cooperative  Breasts: Examined in supine position  Symmetric without masses or skin dimpling  Nipples normal without inversion, lesions or discharge  There are no palpable axillary nodes  Abdomen: no masses, no hepatomegaly, no splenomegaly, soft non-tender, no guarding and no rebound tenderness    Pelvis:  Pelvic: Clinical staff was present for exam  External genitalia:  normal appearance of the external genitalia including Bartholin's and Jansen's glands.  :  urethral meatus normal;  Vagina:  normal pink mucosa without prolapse or lesions.  Cervix:  normal appearance.  Uterus:  normal size, shape and consistency.  Adnexa:  normal bimanual exam of the adnexa.  Rectal:  digital rectal exam not performed; anus " visually normal appearing.       Assessment   Annual well woman exam with age appropriate screening  Screening for sexually transmitted infections  Recent Ureaplasma, treated     Plan    Orders Placed This Encounter   Procedures    Genital Mycoplasmas MISAEL, Swab - Swab, Urethra     Order Specific Question:   Release to patient     Answer:   Routine Release [[id number]]    NuSwab VG+ - Swab, Cervix     Order Specific Question:   Release to patient     Answer:   Routine Release [[id number]]     Medications ordered: None    Procedures performed: Pap smear    - Mammogram: Not indicated   - Pap screening guidelines reviewed; pap smear collected today  - Yearly clinical breast and pelvic exams recommended regardless of pap recommendations  - Dexa scan: not indicated   - Colonoscopy: not indicated   - Healthy diet and exercise encouraged  - Calcium and Vitamin D requirements reviewed  - Contraception: declines  - Screening: STI cultures collected today    Follow up for annual visits    Washington Coughlin MD  Obstetrics and Gynecology  [facility]

## 2024-08-02 ENCOUNTER — PRIOR AUTHORIZATION (OUTPATIENT)
Dept: ENDOCRINOLOGY | Facility: CLINIC | Age: 34
End: 2024-08-02
Payer: MEDICAID

## 2024-08-02 NOTE — TELEPHONE ENCOUNTER
PA for Dexcom G6 Transmitter submitted via Counts include 234 beds at the [facility].

## 2024-08-02 NOTE — TELEPHONE ENCOUNTER
aFriba Bronson (Key: [id number])  PA Case ID #: [id number]  Rx #: [id number]  Need Help? Call us at [phone]  Outcome  Approved today by [place] [employer] 2017  The request has been approved. The authorization is effective from 08/02/2024 to 08/02/2025, as long as the member is enrolled in their current health plan. A written notification letter will follow with additional details.  Authorization Expiration Date: 8/1/2025  Drug  Dexcom G6 Transmitter  ePA cloud logo  Form  [facility] [place] [facility] ePA Form 2017 NCPDP

## 2024-09-08 ENCOUNTER — PATIENT MESSAGE (OUTPATIENT)
Dept: INTERNAL MEDICINE | Facility: CLINIC | Age: 34
End: 2024-09-08
Payer: MEDICAID

## 2024-09-09 RX ORDER — FLUCONAZOLE 150 MG/1
150 TABLET ORAL ONCE
Qty: 1 TABLET | Refills: 1 | Status: SHIPPED | OUTPATIENT
Start: 2024-09-09 | End: 2024-09-09

## 2024-09-09 NOTE — TELEPHONE ENCOUNTER
From: Fariba Bronson  To: Marilin Thakur  Sent: 9/8/2024 6:28 PM EDT  Subject: [employer]     Hey I think I have a yeast infection. I had one a month or so ago and took the one you take one pill, then another 3 days later. Thanks!

## 2024-10-24 ENCOUNTER — OFFICE VISIT (OUTPATIENT)
Dept: ENDOCRINOLOGY | Facility: CLINIC | Age: 34
End: 2024-10-24
Payer: MEDICAID

## 2024-10-24 VITALS
SYSTOLIC BLOOD PRESSURE: 110 MMHG | WEIGHT: 204 LBS | HEART RATE: 78 BPM | HEIGHT: 68 IN | BODY MASS INDEX: 30.92 KG/M2 | OXYGEN SATURATION: 98 % | DIASTOLIC BLOOD PRESSURE: 66 MMHG

## 2024-10-24 DIAGNOSIS — E10.65 TYPE 1 DIABETES MELLITUS WITH HYPERGLYCEMIA: Primary | ICD-10-CM

## 2024-10-24 LAB
CHOLEST SERPL-MCNC: 165 MG/DL (ref 0–200)
EXPIRATION DATE: ABNORMAL
EXPIRATION DATE: ABNORMAL
GLUCOSE BLDC GLUCOMTR-MCNC: 140 MG/DL (ref 70–130)
HBA1C MFR BLD: 9.1 % (ref 4.5–5.7)
HDLC SERPL-MCNC: 45 MG/DL (ref 40–60)
LDLC SERPL CALC-MCNC: 109 MG/DL (ref 0–100)
LDLC/HDLC SERPL: 2.43 {RATIO}
Lab: ABNORMAL
Lab: ABNORMAL
TRIGL SERPL-MCNC: 53 MG/DL (ref 0–150)
VLDLC SERPL-MCNC: 11 MG/DL (ref 5–40)

## 2024-10-24 PROCEDURE — 80061 LIPID PANEL: CPT | Performed by: PHYSICIAN ASSISTANT

## 2024-10-24 RX ORDER — INSULIN ASPART 100 [IU]/ML
INJECTION, SOLUTION INTRAVENOUS; SUBCUTANEOUS
Qty: 90 ML | Refills: 2 | Status: SHIPPED | OUTPATIENT
Start: 2024-10-24

## 2024-10-24 RX ORDER — INSULIN PMP CART,AUT,G6/7,CNTR
1 EACH SUBCUTANEOUS EVERY OTHER DAY
Qty: 15 EACH | Refills: 6 | Status: SHIPPED | OUTPATIENT
Start: 2024-10-24

## 2024-10-24 NOTE — PROGRESS NOTES
"     Office Note      Date: 10/24/2024  Patient Name: Fariba Bronson  MRN: [id number]  : 1990    Chief Complaint   Patient presents with    Diabetes       History of Present Illness:   Fariba Bronson is a 33 y.o. female who presents for follow-up for type 1 diabetes diagnosed in .  She remains on the OmniPod 5 with Dexcom G6 continuous glucose monitor.  She resumed the G6 sensor a couple of months ago.  She had been using up the rest of her G7 supplies that were not connected to her insulin pump.  She reports she does get kicked out of automated mode pretty regularly.  She denies any severe or frequent hypoglycemia.  She is overdue for her eye exam she will call to get this scheduled.  We did lab work as well as her foot exam last visit.  She is fasting today to have her cholesterol checked.  Her daughter will be 2 in December.    Subjective     Review of Systems:   Review of Systems   Constitutional: Negative.    Cardiovascular: Negative.    Gastrointestinal: Negative.    Endocrine: Negative.    Neurological: Negative.        The following portions of the patient's history were reviewed and updated as appropriate: allergies, current medications, past family history, past medical history, past social history, past surgical history, and problem list.    Objective     Vitals:    10/24/24 0900   BP: 110/66   BP Location: Right arm   Patient Position: Sitting   Cuff Size: Adult   Pulse: 78   SpO2: 98%   Weight: 92.5 kg (204 lb)   Height: 172.7 cm (68\")     Body mass index is 31.02 kg/m².    Physical Exam  Vitals reviewed.   Constitutional:       General: She is not in acute distress.     Appearance: Normal appearance.   Neurological:      Mental Status: She is alert.         HEMOGLOBIN A1C  Lab Results   Component Value Date    HGBA1C 9.1 (A) 10/24/2024       GLUCOSE  Glucose   Date Value Ref Range Status   10/24/2024 140 (A) 70 - 130 mg/dL Final   2022 74 70 - 99 mg/dL Final       [facility]  Lab " Results   Component Value Date    GLUCOSE 226 (H) 06/20/2024    BUN 10 06/20/2024    CREATININE 0.75 06/20/2024    BCR 13.3 06/20/2024    K 4.0 06/20/2024    CO2 23.9 06/20/2024    CALCIUM 8.7 06/20/2024    AST 8 06/20/2024    ALT 7 06/20/2024           URINE MICROALBUMIN/CREATININE RATIO  Microalbumin/Creatinine Ratio   Date Value Ref Range Status   06/20/2024   Final     Comment:     Unable to calculate       TSH  Lab Results   Component Value Date    TSH 0.971 06/20/2024       Assessment / Plan      Assessment & Plan:  1. Type 1 diabetes mellitus with hyperglycemia  Her hemoglobin A1c is above goal at 9.1%.  This is stable as compared to last visit.  I reviewed her OmniPod download with continuous glucose monitor readings.  Her average glucose for the last 13 days has been 227 mg/dL with 28% of her readings within range, 34% high, 38% very high, 0% low and 0% very low.  We increased her max basal rate to 7.0 to try to keep her in automated mode.  We also changed her correction factor from 25-20 to try to improve hyperglycemia.  She will reach out if she has any trouble.  Her blood pressure is okay today.  Her weight is stable.  I encouraged healthy eating habits and physical activity as tolerated.  I encouraged her to get her eye exam scheduled.  Fasting lipid profile pending today.  Will send note with results and plan.  I refilled her NovoLog and OmniPod's today.  We discussed updating to the G7.  She prefers to try the apple pop coming out soon.  - POC Glycosylated Hemoglobin (Hb A1C)  - POC Glucose, Blood  - Lipid Panel; Future  - NovoLOG 100 UNIT/ML injection; Use as directed in insulin pump  units/day  Dispense: 90 mL; Refill: 2  - Insulin Disposable Pump (Omnipod 5 G6 Pods, Gen 5,) misc; Inject 1 Device under the skin into the appropriate area as directed Every Other Day.  Dispense: 15 each; Refill: 6  - Lipid Panel      Return in about 4 months (around 2/24/2025) for Recheck.     This note was  dictated using Dragon voice recognition.    Electronically signed by: ELISA Meza  10/24/2024

## 2024-11-04 ENCOUNTER — PRIOR AUTHORIZATION (OUTPATIENT)
Dept: ENDOCRINOLOGY | Facility: CLINIC | Age: 34
End: 2024-11-04
Payer: MEDICAID

## 2024-11-05 NOTE — TELEPHONE ENCOUNTER
Fariba Bronson (Key: [id number])  PA Case ID #: [id number]  Rx #: [id number]  Need Help? Call us at [phone]  Outcome  Approved on November 4 by [place] [employer] MedImpact 2017  The request has been approved. The authorization is effective from 11/04/2024 to 11/03/2025, as long as the member is enrolled in their current health plan. The request was approved as submitted. A written notification letter will follow with additional details.  Authorization Expiration Date: 11/2/2025  Drug  Omnipod 5 G7 Pods (Gen 5)  ePA cloud logo  Form  [facility] [place] [facility] ePA [facility] 2017 NCPDP

## 2025-01-30 RX ORDER — NITROFURANTOIN 25; 75 MG/1; MG/1
100 CAPSULE ORAL EVERY 12 HOURS SCHEDULED
Qty: 14 CAPSULE | Refills: 0 | Status: SHIPPED | OUTPATIENT
Start: 2025-01-30 | End: 2025-02-06

## 2025-02-17 ENCOUNTER — PATIENT MESSAGE (OUTPATIENT)
Dept: ENDOCRINOLOGY | Facility: CLINIC | Age: 35
End: 2025-02-17
Payer: COMMERCIAL

## 2025-02-17 DIAGNOSIS — E10.65 TYPE 1 DIABETES MELLITUS WITH HYPERGLYCEMIA: Primary | ICD-10-CM

## 2025-02-18 ENCOUNTER — CLINICAL SUPPORT (OUTPATIENT)
Dept: INTERNAL MEDICINE | Facility: CLINIC | Age: 35
End: 2025-02-18
Payer: COMMERCIAL

## 2025-02-18 DIAGNOSIS — E10.65 TYPE 1 DIABETES MELLITUS WITH HYPERGLYCEMIA: Primary | ICD-10-CM

## 2025-02-18 LAB
EXPIRATION DATE: ABNORMAL
HBA1C MFR BLD: 8.3 % (ref 4.5–5.7)
Lab: ABNORMAL

## 2025-02-18 PROCEDURE — 83036 HEMOGLOBIN GLYCOSYLATED A1C: CPT | Performed by: NURSE PRACTITIONER

## 2025-02-20 ENCOUNTER — TELEMEDICINE (OUTPATIENT)
Dept: ENDOCRINOLOGY | Facility: CLINIC | Age: 35
End: 2025-02-20
Payer: COMMERCIAL

## 2025-02-20 DIAGNOSIS — E10.65 TYPE 1 DIABETES MELLITUS WITH HYPERGLYCEMIA: ICD-10-CM

## 2025-02-20 PROCEDURE — 1159F MED LIST DOCD IN RCRD: CPT | Performed by: PHYSICIAN ASSISTANT

## 2025-02-20 PROCEDURE — 3052F HG A1C>EQUAL 8.0%<EQUAL 9.0%: CPT | Performed by: PHYSICIAN ASSISTANT

## 2025-02-20 PROCEDURE — 1160F RVW MEDS BY RX/DR IN RCRD: CPT | Performed by: PHYSICIAN ASSISTANT

## 2025-02-20 PROCEDURE — 95251 CONT GLUC MNTR ANALYSIS I&R: CPT | Performed by: PHYSICIAN ASSISTANT

## 2025-02-20 PROCEDURE — 99213 OFFICE O/P EST LOW 20 MIN: CPT | Performed by: PHYSICIAN ASSISTANT

## 2025-02-20 RX ORDER — PROCHLORPERAZINE 25 MG/1
SUPPOSITORY RECTAL
Qty: 3 EACH | Refills: 11 | Status: SHIPPED | OUTPATIENT
Start: 2025-02-20

## 2025-02-20 RX ORDER — PROCHLORPERAZINE 25 MG/1
1 SUPPOSITORY RECTAL
Qty: 1 EACH | Refills: 3 | Status: SHIPPED | OUTPATIENT
Start: 2025-02-20

## 2025-02-20 NOTE — PROGRESS NOTES
Office Note      Date: 2025  Patient Name: Fariba Bronson  MRN: [id number]  : 1990    Chief Complaint   Patient presents with    Diabetes     Type 1       History of Present Illness:   Fariba Bronson is a 34 y.o. female who presents for follow-up for type 1 diabetes diagnosed in .  This visit was completed via Science video visit.  Patient requested this due to the weather.  Both parties were in the [facility].  Patient consented for this visit to be conducted via "[employer]hart video visit.  She remains on the OmniPod insulin pump with Dexcom G6 continuous glucose monitor.  She is not using the Apple iPhone pop but was excited to hear this was available.  She reports she was happy to see her hemoglobin A1c was better.  Her A1c earlier this week was 8.3%.  She reports her blood sugars do tend to come down overnight but tend to spike during the day after she eats.  She reports she has left her basal rates on the increased rates we were using for her period and this seems to help.  She denies any severe or frequent hypoglycemia.  She has not had her eye exam scheduled yet but plans to call today.  Her daughter ended up with the flu and she and her parents both got it.  She was sick last week but is doing better today.          Subjective     Review of Systems:   Review of Systems   Constitutional: Negative.    Cardiovascular: Negative.    Gastrointestinal: Negative.    Endocrine: Negative.    Neurological: Negative.        The following portions of the patient's history were reviewed and updated as appropriate: allergies, current medications, past family history, past medical history, past social history, past surgical history, and problem list.    Objective     There were no vitals filed for this visit.  There is no height or weight on file to calculate BMI.    Physical Exam  Constitutional:       General: She is not in acute distress.     Appearance: Normal appearance.      Comments:  Via [employer] video visit   Neurological:      Mental Status: She is alert.         HEMOGLOBIN A1C  Lab Results   Component Value Date    HGBA1C 8.3 (A) 02/18/2025       GLUCOSE  Glucose   Date Value Ref Range Status   10/24/2024 140 (A) 70 - 130 mg/dL Final   12/17/2022 74 70 - 99 mg/dL Final       CMP  Lab Results   Component Value Date    GLUCOSE 226 (H) 06/20/2024    BUN 10 06/20/2024    CREATININE 0.75 06/20/2024    BCR 13.3 06/20/2024    K 4.0 06/20/2024    CO2 23.9 06/20/2024    CALCIUM 8.7 06/20/2024    AST 8 06/20/2024    ALT 7 06/20/2024       LIPID PANEL  Lab Results   Component Value Date    CHOL 165 10/24/2024    TRIG 53 10/24/2024    HDL 45 10/24/2024     (H) 10/24/2024       URINE MICROALBUMIN/CREATININE RATIO  Microalbumin/Creatinine Ratio   Date Value Ref Range Status   06/20/2024   Final     Comment:     Unable to calculate       TSH  Lab Results   Component Value Date    TSH 0.971 06/20/2024       Assessment / Plan      Assessment & Plan:  1. Type 1 diabetes mellitus with hyperglycemia  Her hemoglobin A1c earlier this week was 8.3%.  This has improved but is still above goal.  I reviewed her OmniPod download with continuous glucose monitor readings.  Her average glucose is 216 mg/dL with 43% of her readings within target range, 25% high, 32% very high, 0% low and 0% very low.  She does tend to spike after her meals.  We changed her insulin to carb ratio from 5-4 to try to improve hyperglycemia.  She will reach out with blood sugar readings if she needs further adjustment.  I refilled her Dexcom supplies today.  We discussed the new OmniPod iPhone pop and she will work on getting this set up.  She will be due for fasting labs as well as her foot exam next visit.  - Continuous Glucose Transmitter (Dexcom G6 Transmitter) misc; Use 1 each Every 3 (Three) Months.  Dispense: 1 each; Refill: 3      Return in about 4 months (around 6/20/2025) for Recheck, fasting.     This note was dictated using  Dragon voice recognition.    Electronically signed by: ELISA Meza  02/20/2025

## 2025-04-11 RX ORDER — POLYMYXIN B SULFATE AND TRIMETHOPRIM 1; 10000 MG/ML; [USP'U]/ML
1 SOLUTION OPHTHALMIC EVERY 4 HOURS
Qty: 10 ML | Refills: 0 | Status: SHIPPED | OUTPATIENT
Start: 2025-04-11 | End: 2025-04-18

## 2025-05-28 ENCOUNTER — PRIOR AUTHORIZATION (OUTPATIENT)
Dept: ENDOCRINOLOGY | Facility: CLINIC | Age: 35
End: 2025-05-28
Payer: COMMERCIAL

## 2025-05-28 NOTE — TELEPHONE ENCOUNTER
Fariba Bronson (Key: [id number])  PA Case ID #: [id number]  Rx #: [id number]  Need Help? Call us at [phone]  Outcome  Approved today by [place] Medicaid MedImpact 2017  The request has been approved. The authorization is effective from 05/28/2025 to 05/28/2026, as long as the member is enrolled in their current health plan. A written notification letter will follow with additional details.  Effective Date: 5/28/2025  Authorization Expiration Date: 5/28/2026  Drug  Dexcom G6 Sensor  ePA cloud logo  Form  [employer] [place] Medicaid ePA Form 2017 NCPDP

## 2025-07-07 DIAGNOSIS — F41.9 ANXIETY: ICD-10-CM

## 2025-07-07 DIAGNOSIS — F33.1 MODERATE EPISODE OF RECURRENT MAJOR DEPRESSIVE DISORDER: ICD-10-CM

## 2025-07-07 RX ORDER — CITALOPRAM HYDROBROMIDE 20 MG/1
20 TABLET ORAL DAILY
Qty: 90 TABLET | Refills: 3 | Status: SHIPPED | OUTPATIENT
Start: 2025-07-07

## 2025-07-07 NOTE — TELEPHONE ENCOUNTER
Rx Refill Note  Requested Prescriptions     Pending Prescriptions Disp Refills    citalopram (CeleXA) 20 MG tablet [Pharmacy Med Name: CITALOPRAM HBR 20 MG TABLET] 90 tablet 3     Sig: TAKE 1 TABLET BY MOUTH EVERY DAY      Last office visit with prescribing clinician: 5/31/2024   Last telemedicine visit with prescribing clinician: Visit date not found   Next office visit with prescribing clinician: Visit date not found                         Would you like a call back once the refill request has been completed: [] Yes [] No    If the office needs to give you a call back, can they leave a voicemail: [] Yes [] No    Sierra Cage MA  07/07/25, 11:03 EDT

## 2025-07-17 ENCOUNTER — OFFICE VISIT (OUTPATIENT)
Dept: ENDOCRINOLOGY | Facility: CLINIC | Age: 35
End: 2025-07-17
Payer: COMMERCIAL

## 2025-07-17 VITALS
BODY MASS INDEX: 33.46 KG/M2 | SYSTOLIC BLOOD PRESSURE: 118 MMHG | TEMPERATURE: 96.8 F | WEIGHT: 220.8 LBS | HEART RATE: 88 BPM | HEIGHT: 68 IN | RESPIRATION RATE: 16 BRPM | OXYGEN SATURATION: 99 % | DIASTOLIC BLOOD PRESSURE: 78 MMHG

## 2025-07-17 DIAGNOSIS — E10.65 TYPE 1 DIABETES MELLITUS WITH HYPERGLYCEMIA: Primary | ICD-10-CM

## 2025-07-17 LAB
EXPIRATION DATE: ABNORMAL
EXPIRATION DATE: ABNORMAL
GLUCOSE BLDC GLUCOMTR-MCNC: 330 MG/DL (ref 70–130)
HBA1C MFR BLD: 8.1 % (ref 4.5–5.7)
Lab: ABNORMAL
Lab: ABNORMAL

## 2025-07-17 RX ORDER — INSULIN ASPART 100 [IU]/ML
INJECTION, SOLUTION INTRAVENOUS; SUBCUTANEOUS
Qty: 90 ML | Refills: 2 | Status: SHIPPED | OUTPATIENT
Start: 2025-07-17

## 2025-07-17 RX ORDER — ACYCLOVIR 400 MG/1
1 TABLET ORAL
Qty: 9 EACH | Refills: 3 | Status: SHIPPED | OUTPATIENT
Start: 2025-07-17

## 2025-07-17 RX ORDER — INSULIN PMP CART,AUT,G6/7,CNTR
1 EACH SUBCUTANEOUS EVERY OTHER DAY
Qty: 45 EACH | Refills: 3 | Status: SHIPPED | OUTPATIENT
Start: 2025-07-17

## 2025-07-17 NOTE — PROGRESS NOTES
"     Office Note      Date: 2025  Patient Name: Fariba Bronson  MRN: [id number]  : 1990    Chief Complaint   Patient presents with    Type 1 diabetes mellitus with hyperglycemia       History of Present Illness:   Fariba Bronson is a 34 y.o. female who presents for follow-up for type 1 diabetes diagnosed in .  She remains on the OmniPod insulin pump she has been using the G7 sensors because her dad has had some.  She reports she has had some trouble keeping these connected and keeping her pods in auto mode but otherwise things seem to be working okay.  She reports she has had some higher blood sugars recently.  She is in a custody ortiz with her daughter's father and this has been stressful.  She is due for her eye exam and will work on getting this scheduled.  She denies any trouble with her feet today.  She is due for fasting labs.  She is not fasting today but will go to a [facility] location in the next few weeks to have labs completed.      Subjective     Review of Systems:   Review of Systems   Constitutional: Negative.    Cardiovascular: Negative.    Gastrointestinal: Negative.    Endocrine: Negative.    Neurological: Negative.        The following portions of the patient's history were reviewed and updated as appropriate: allergies, current medications, past family history, past medical history, past social history, past surgical history, and problem list.    Objective     Vitals:    25 0857   BP: 118/78   BP Location: Left arm   Patient Position: Sitting   Cuff Size: Adult   Pulse: 88   Resp: 16   Temp: 96.8 °F (36 °C)   TempSrc: Infrared   SpO2: 99%   Weight: 100 kg (220 lb 12.8 oz)   Height: 172.7 cm (67.99\")   PainSc: 0-No pain     Body mass index is 33.58 kg/m².    Physical Exam  Vitals reviewed.   Constitutional:       General: She is not in acute distress.     Appearance: Normal appearance.   Cardiovascular:      Pulses:           Dorsalis pedis pulses are 2+ on the " right side and 2+ on the left side.        Posterior tibial pulses are 2+ on the right side and 2+ on the left side.   Musculoskeletal:      Right foot: No deformity.      Left foot: No deformity.   Feet:      Right foot:      Protective Sensation: 5 sites tested.  5 sites sensed.      Skin integrity: Skin integrity normal.      Toenail Condition: Right toenails are normal.      Left foot:      Protective Sensation: 5 sites tested.  5 sites sensed.      Skin integrity: Skin integrity normal.      Toenail Condition: Left toenails are normal.      Comments: Diabetic Foot Exam Performed and Monofilament Test Performed      Neurological:      Mental Status: She is alert.         HEMOGLOBIN A1C  Lab Results   Component Value Date    HGBA1C 8.1 (A) 07/17/2025       GLUCOSE  Glucose   Date Value Ref Range Status   07/17/2025 330 (A) 70 - 130 mg/dL Final   12/17/2022 74 70 - 99 mg/dL Final       CMP  Lab Results   Component Value Date    GLUCOSE 226 (H) 06/20/2024    BUN 10 06/20/2024    CREATININE 0.75 06/20/2024    BCR 13.3 06/20/2024    K 4.0 06/20/2024    CO2 23.9 06/20/2024    CALCIUM 8.7 06/20/2024    AST 8 06/20/2024    ALT 7 06/20/2024       LIPID PANEL  Lab Results   Component Value Date    CHOL 165 10/24/2024    TRIG 53 10/24/2024    HDL 45 10/24/2024     (H) 10/24/2024       URINE MICROALBUMIN/CREATININE RATIO  Microalbumin/Creatinine Ratio   Date Value Ref Range Status   06/20/2024   Final     Comment:     Unable to calculate       TSH  Lab Results   Component Value Date    TSH 0.971 06/20/2024       Assessment / Plan      Assessment & Plan:  1. Type 1 diabetes mellitus with hyperglycemia  Her hemoglobin A1c has improved slightly as compared to February at 8.1%.  I reviewed her OmniPod download.  Her blood sugars have been higher.  She has only been in auto mode about 32% of that time.  Her average glucose for the last 2 weeks is 237 mg/dL with 28% of her readings within target range, 34% high, 38% very  high, 0% low and 0% very low.  We changed her basal rates and increase these so if she is no longer in auto mode her pump will give her more insulin.  She will reach out if she has any trouble with the new settings.  Discussed keeping the OmniPod and Dexcom on the same side of the body to keep these connected.  I updated her prescription for OmniPod's to the G6 G7 compatible and send a prescription for G7 sensors.  We discussed the OmniPod iPhone pop.  Her blood pressure is okay today.  She will continue her current medication.  Will have fasting labs completed in the next few weeks for monitoring.  I will send a note with results and plan.  Her foot exam was okay today.  I encouraged her to get her eye exam scheduled.  We discussed doing the retinopathy scans in the office but she reports she needs a new prescription and will get this scheduled.  - POC Glucose, Blood  - POC Glycosylated Hemoglobin (Hb A1C)  - NovoLOG 100 UNIT/ML injection; Use as directed in insulin pump  units/day  Dispense: 90 mL; Refill: 2  - Comprehensive Metabolic Panel; Future  - TSH; Future  - Lipid Panel; Future  - Microalbumin / Creatinine Urine Ratio - Urine, Clean Catch; Future      Return in about 4 months (around 11/17/2025) for Recheck.     This note was dictated using Dragon voice recognition.    Electronically signed by: ELISA Meza  07/17/2025

## 2025-07-29 ENCOUNTER — CLINICAL SUPPORT (OUTPATIENT)
Dept: INTERNAL MEDICINE | Facility: CLINIC | Age: 35
End: 2025-07-29
Payer: COMMERCIAL

## 2025-07-29 DIAGNOSIS — R09.81 NASAL CONGESTION: ICD-10-CM

## 2025-07-29 DIAGNOSIS — R51.9 ACUTE NONINTRACTABLE HEADACHE, UNSPECIFIED HEADACHE TYPE: Primary | ICD-10-CM

## 2025-07-29 LAB
EXPIRATION DATE: NORMAL
EXPIRATION DATE: NORMAL
FLUAV AG UPPER RESP QL IA.RAPID: NOT DETECTED
FLUBV AG UPPER RESP QL IA.RAPID: NOT DETECTED
INTERNAL CONTROL: NORMAL
INTERNAL CONTROL: NORMAL
Lab: NORMAL
Lab: NORMAL
S PYO AG THROAT QL: NEGATIVE
SARS-COV-2 AG UPPER RESP QL IA.RAPID: NOT DETECTED

## 2025-07-29 PROCEDURE — 87880 STREP A ASSAY W/OPTIC: CPT | Performed by: FAMILY MEDICINE

## 2025-07-29 PROCEDURE — 87428 SARSCOV & INF VIR A&B AG IA: CPT | Performed by: FAMILY MEDICINE
